# Patient Record
Sex: FEMALE | Race: BLACK OR AFRICAN AMERICAN | NOT HISPANIC OR LATINO | Employment: FULL TIME | URBAN - METROPOLITAN AREA
[De-identification: names, ages, dates, MRNs, and addresses within clinical notes are randomized per-mention and may not be internally consistent; named-entity substitution may affect disease eponyms.]

---

## 2021-06-04 ENCOUNTER — HOSPITAL ENCOUNTER (OUTPATIENT)
Dept: RADIOLOGY | Facility: HOSPITAL | Age: 26
Discharge: HOME | End: 2021-06-04
Attending: STUDENT IN AN ORGANIZED HEALTH CARE EDUCATION/TRAINING PROGRAM
Payer: MEDICARE

## 2021-06-04 DIAGNOSIS — R10.9 ABDOMINAL PAIN: ICD-10-CM

## 2021-06-04 PROCEDURE — 76705 ECHO EXAM OF ABDOMEN: CPT | Mod: TC

## 2021-06-04 PROCEDURE — 76705 US ABDOMEN LIMITED: ICD-10-PCS | Mod: 26,,, | Performed by: INTERNAL MEDICINE

## 2021-06-04 PROCEDURE — 76705 ECHO EXAM OF ABDOMEN: CPT | Mod: 26,,, | Performed by: INTERNAL MEDICINE

## 2022-11-08 ENCOUNTER — OFFICE VISIT (OUTPATIENT)
Dept: URGENT CARE | Facility: CLINIC | Age: 27
End: 2022-11-08
Payer: COMMERCIAL

## 2022-11-08 VITALS
RESPIRATION RATE: 18 BRPM | SYSTOLIC BLOOD PRESSURE: 124 MMHG | HEART RATE: 65 BPM | TEMPERATURE: 99 F | DIASTOLIC BLOOD PRESSURE: 85 MMHG | WEIGHT: 203 LBS | HEIGHT: 64 IN | BODY MASS INDEX: 34.66 KG/M2 | OXYGEN SATURATION: 98 %

## 2022-11-08 DIAGNOSIS — J02.0 STREP PHARYNGITIS: ICD-10-CM

## 2022-11-08 DIAGNOSIS — R13.12 OROPHARYNGEAL DYSPHAGIA: ICD-10-CM

## 2022-11-08 DIAGNOSIS — R09.A2 GLOBUS SENSATION: ICD-10-CM

## 2022-11-08 DIAGNOSIS — K21.9 GASTROESOPHAGEAL REFLUX DISEASE WITHOUT ESOPHAGITIS: ICD-10-CM

## 2022-11-08 DIAGNOSIS — R59.9 GLANDS SWOLLEN: Primary | ICD-10-CM

## 2022-11-08 LAB
CTP QC/QA: YES
MOLECULAR STREP A: POSITIVE

## 2022-11-08 PROCEDURE — 70360 X-RAY EXAM OF NECK: CPT | Mod: FY,S$GLB,, | Performed by: RADIOLOGY

## 2022-11-08 PROCEDURE — 96372 PR INJECTION,THERAP/PROPH/DIAG2ST, IM OR SUBCUT: ICD-10-PCS | Mod: S$GLB,,, | Performed by: FAMILY MEDICINE

## 2022-11-08 PROCEDURE — 99214 PR OFFICE/OUTPT VISIT, EST, LEVL IV, 30-39 MIN: ICD-10-PCS | Mod: 25,S$GLB,, | Performed by: FAMILY MEDICINE

## 2022-11-08 PROCEDURE — 3008F BODY MASS INDEX DOCD: CPT | Mod: CPTII,S$GLB,, | Performed by: FAMILY MEDICINE

## 2022-11-08 PROCEDURE — 1160F PR REVIEW ALL MEDS BY PRESCRIBER/CLIN PHARMACIST DOCUMENTED: ICD-10-PCS | Mod: CPTII,S$GLB,, | Performed by: FAMILY MEDICINE

## 2022-11-08 PROCEDURE — 1160F RVW MEDS BY RX/DR IN RCRD: CPT | Mod: CPTII,S$GLB,, | Performed by: FAMILY MEDICINE

## 2022-11-08 PROCEDURE — 3008F PR BODY MASS INDEX (BMI) DOCUMENTED: ICD-10-PCS | Mod: CPTII,S$GLB,, | Performed by: FAMILY MEDICINE

## 2022-11-08 PROCEDURE — 87651 STREP A DNA AMP PROBE: CPT | Mod: QW,S$GLB,, | Performed by: FAMILY MEDICINE

## 2022-11-08 PROCEDURE — 3074F SYST BP LT 130 MM HG: CPT | Mod: CPTII,S$GLB,, | Performed by: FAMILY MEDICINE

## 2022-11-08 PROCEDURE — 70360 XR NECK SOFT TISSUE: ICD-10-PCS | Mod: FY,S$GLB,, | Performed by: RADIOLOGY

## 2022-11-08 PROCEDURE — 96372 THER/PROPH/DIAG INJ SC/IM: CPT | Mod: S$GLB,,, | Performed by: FAMILY MEDICINE

## 2022-11-08 PROCEDURE — 3074F PR MOST RECENT SYSTOLIC BLOOD PRESSURE < 130 MM HG: ICD-10-PCS | Mod: CPTII,S$GLB,, | Performed by: FAMILY MEDICINE

## 2022-11-08 PROCEDURE — 3079F DIAST BP 80-89 MM HG: CPT | Mod: CPTII,S$GLB,, | Performed by: FAMILY MEDICINE

## 2022-11-08 PROCEDURE — 99214 OFFICE O/P EST MOD 30 MIN: CPT | Mod: 25,S$GLB,, | Performed by: FAMILY MEDICINE

## 2022-11-08 PROCEDURE — 3079F PR MOST RECENT DIASTOLIC BLOOD PRESSURE 80-89 MM HG: ICD-10-PCS | Mod: CPTII,S$GLB,, | Performed by: FAMILY MEDICINE

## 2022-11-08 PROCEDURE — 1159F MED LIST DOCD IN RCRD: CPT | Mod: CPTII,S$GLB,, | Performed by: FAMILY MEDICINE

## 2022-11-08 PROCEDURE — 87651 POCT STREP A MOLECULAR: ICD-10-PCS | Mod: QW,S$GLB,, | Performed by: FAMILY MEDICINE

## 2022-11-08 PROCEDURE — 1159F PR MEDICATION LIST DOCUMENTED IN MEDICAL RECORD: ICD-10-PCS | Mod: CPTII,S$GLB,, | Performed by: FAMILY MEDICINE

## 2022-11-08 RX ORDER — IBUPROFEN 800 MG/1
800 TABLET ORAL EVERY 6 HOURS PRN
COMMUNITY
End: 2023-10-13

## 2022-11-08 RX ORDER — AMOXICILLIN AND CLAVULANATE POTASSIUM 875; 125 MG/1; MG/1
1 TABLET, FILM COATED ORAL EVERY 12 HOURS
Qty: 20 TABLET | Refills: 0 | Status: SHIPPED | OUTPATIENT
Start: 2022-11-08 | End: 2022-11-18

## 2022-11-08 RX ORDER — DEXAMETHASONE SODIUM PHOSPHATE 100 MG/10ML
10 INJECTION INTRAMUSCULAR; INTRAVENOUS
Status: COMPLETED | OUTPATIENT
Start: 2022-11-08 | End: 2022-11-08

## 2022-11-08 RX ORDER — OMEPRAZOLE 10 MG/1
10 CAPSULE, DELAYED RELEASE ORAL DAILY
COMMUNITY
End: 2022-12-20

## 2022-11-08 RX ADMIN — DEXAMETHASONE SODIUM PHOSPHATE 10 MG: 100 INJECTION INTRAMUSCULAR; INTRAVENOUS at 03:11

## 2022-11-08 NOTE — PROGRESS NOTES
"Subjective:       Patient ID: Moriah Lee is a 26 y.o. female.    Vitals:  height is 5' 4" (1.626 m) and weight is 92.1 kg (203 lb). Her oral temperature is 98.9 °F (37.2 °C). Her blood pressure is 124/85 and her pulse is 65. Her respiration is 18 and oxygen saturation is 98%.     Chief Complaint: Other Misc (Feeling as if something is stuck in the back of my throat. Has been happening for two weeks. - Entered by patient)    Pt states she has had swelling to back of throat for few weeks. Pt states swelling went down but then returned. Pt states she felt that it was triggered but her acid reflux so she paid no mind, but the swelling has returned, is getting much bigger and is becoming hard for her to swallow. Denies any chance of pregnancy.    Sore Throat   This is a new problem. The current episode started 1 to 4 weeks ago. The problem has been gradually worsening. Neither side of throat is experiencing more pain than the other. There has been no fever. The pain is at a severity of 0/10. The patient is experiencing no pain. Associated symptoms include swollen glands and trouble swallowing. Pertinent negatives include no congestion, coughing, shortness of breath or vomiting. Treatments tried: cough drops, robitussin. The treatment provided no relief.     Constitution: Negative for activity change, appetite change, chills, fatigue, fever and generalized weakness.   HENT:  Positive for sore throat and trouble swallowing. Negative for congestion, postnasal drip and sinus pressure.    Neck: Positive for neck swelling.   Cardiovascular:  Negative for chest pain, leg swelling, palpitations, sob on exertion and passing out.   Eyes:  Negative for vision loss.   Respiratory:  Negative for chest tightness, cough and shortness of breath.    Gastrointestinal:  Negative for nausea and vomiting.   Genitourinary:  Negative for dysuria.   Skin:  Negative for rash.   Neurological:  Negative for passing out, altered mental " "status and numbness.   Psychiatric/Behavioral:  Negative for altered mental status, confusion and agitation.      Objective:      Vitals:    11/08/22 1431   BP: 124/85   Pulse: 65   Resp: 18   Temp: 98.9 °F (37.2 °C)   TempSrc: Oral   SpO2: 98%   Weight: 92.1 kg (203 lb)   Height: 5' 4" (1.626 m)      Physical Exam   Constitutional: She is oriented to person, place, and time. She appears well-developed. She is cooperative.  Non-toxic appearance. She does not appear ill. No distress.   HENT:   Head: Normocephalic and atraumatic.   Ears:   Right Ear: Hearing, tympanic membrane, external ear and ear canal normal.   Left Ear: Hearing, tympanic membrane, external ear and ear canal normal.   Nose: Nose normal. No mucosal edema, rhinorrhea or nasal deformity. No epistaxis. Right sinus exhibits no maxillary sinus tenderness and no frontal sinus tenderness. Left sinus exhibits no maxillary sinus tenderness and no frontal sinus tenderness.   Mouth/Throat: Uvula is midline and mucous membranes are normal. No trismus in the jaw. Normal dentition. No uvula swelling. Posterior oropharyngeal erythema present. No oropharyngeal exudate or posterior oropharyngeal edema.   Eyes: Conjunctivae and lids are normal. No scleral icterus.   Neck: Trachea normal and phonation normal. Neck supple. No edema present. No erythema present. No neck rigidity present.   Cardiovascular: Normal rate, regular rhythm, normal heart sounds and normal pulses.   Pulmonary/Chest: Effort normal and breath sounds normal. No respiratory distress. She has no decreased breath sounds. She has no rhonchi.   Abdominal: Normal appearance and bowel sounds are normal. Soft.   Lymphadenopathy:     She has cervical adenopathy.   Neurological: no focal deficit. She is alert and oriented to person, place, and time. She exhibits normal muscle tone.   Skin: Skin is warm, intact and not diaphoretic.   Psychiatric: Her speech is normal and behavior is normal. Judgment and " thought content normal.   Nursing note and vitals reviewed.      Results for orders placed or performed in visit on 11/08/22   POCT Strep A, Molecular   Result Value Ref Range    Molecular Strep A, POC Positive (A) Negative     Acceptable Yes       XR NECK SOFT TISSUE    Result Date: 11/8/2022  EXAMINATION: XR NECK SOFT TISSUE CLINICAL HISTORY: Other specified symptoms and signs involving the circulatory and respiratory systems TECHNIQUE: Single lateral view of the neck with submitted for review COMPARISON: None. FINDINGS: Please note, only single lateral view was submitted for review. Spinal alignment is appropriate, no significant vertebral body height loss or disc space height loss.  The facet joints are aligned.  No acute displaced fracture or dislocation of the cervical spine.  The paraspinous and hypopharyngeal soft tissues are unremarkable.  No tonsillar enlargement.  No epiglottic enlargement.     1. No discrete radiographic abnormality of the soft tissues of the neck allowing for single lateral view. Electronically signed by: Ian Kimball MD Date:    11/08/2022 Time:    15:55    Assessment:       1. Glands swollen    2. Strep pharyngitis    3. Globus sensation    4. Oropharyngeal dysphagia    5. Gastroesophageal reflux disease without esophagitis          Plan:         Glands swollen  -     POCT Strep A, Molecular  -     dexAMETHasone injection 10 mg    2. Strep pharyngitis  -     Ambulatory referral/consult to ENT for persistent symptoms  -     amoxicillin-clavulanate 875-125mg (AUGMENTIN) 875-125 mg per tablet; Take 1 tablet by mouth every 12 (twelve) hours. Take with yogurt or probiotic to protect the stomach/ prevent diarrhea. for 10 days  Dispense: 20 tablet; Refill: 0    3. Globus sensation  -     XR NECK SOFT TISSUE; Future; Expected date: 11/08/2022  -     dexAMETHasone injection 10 mg  -     (Magic mouthwash) 1:1:1 diphenhydrAMINE(Benadryl) 12.5mg/5ml liq, aluminum & magnesium  hydroxide-simethicone (Maalox), LIDOcaine viscous 2%; Swish and spit 15 mLs every 4 (four) hours as needed (irritation/ pain/ discomfort.).  Dispense: 450 mL; Refill: 0  -     Ambulatory referral/consult to ENT    4. Oropharyngeal dysphagia  -     Ambulatory referral/consult to ENT for persistent treatment despite treatment of strep    5. Gastroesophageal reflux disease without esophagitis  -     Ambulatory referral/consult to Gastroenterology. Continue PPI therapy.    Patient Instructions   You received a steroid shot today  -this can elevate your blood pressure, elevate blood sugar, cause water weight gain, nervous energy, redness to the face and dimpling of the skin where the shot was administered.     I have placed referrals to ENT and Gastroenterology  Call to schedule an appointment: [phone]                     20-Oct-2019 05:44

## 2022-11-08 NOTE — PATIENT INSTRUCTIONS
You received a steroid shot today  -this can elevate your blood pressure, elevate blood sugar, cause water weight gain, nervous energy, redness to the face and dimpling of the skin where the shot was administered.     I have placed referrals to ENT and Gastroenterology  Call to schedule an appointment: [phone]

## 2022-12-20 ENCOUNTER — OFFICE VISIT (OUTPATIENT)
Dept: OTOLARYNGOLOGY | Facility: CLINIC | Age: 27
End: 2022-12-20
Payer: COMMERCIAL

## 2022-12-20 VITALS — HEART RATE: 70 BPM | DIASTOLIC BLOOD PRESSURE: 90 MMHG | SYSTOLIC BLOOD PRESSURE: 130 MMHG

## 2022-12-20 DIAGNOSIS — K21.9 LARYNGOPHARYNGEAL REFLUX (LPR): Primary | ICD-10-CM

## 2022-12-20 DIAGNOSIS — J30.89 NON-SEASONAL ALLERGIC RHINITIS, UNSPECIFIED TRIGGER: ICD-10-CM

## 2022-12-20 PROCEDURE — 99999 PR PBB SHADOW E&M-EST. PATIENT-LVL III: CPT | Mod: PBBFAC,,, | Performed by: NURSE PRACTITIONER

## 2022-12-20 PROCEDURE — 3080F PR MOST RECENT DIASTOLIC BLOOD PRESSURE >= 90 MM HG: ICD-10-PCS | Mod: CPTII,S$GLB,, | Performed by: NURSE PRACTITIONER

## 2022-12-20 PROCEDURE — 31575 LARYNGOSCOPY: ICD-10-PCS | Mod: S$GLB,,, | Performed by: NURSE PRACTITIONER

## 2022-12-20 PROCEDURE — 3080F DIAST BP >= 90 MM HG: CPT | Mod: CPTII,S$GLB,, | Performed by: NURSE PRACTITIONER

## 2022-12-20 PROCEDURE — 1159F PR MEDICATION LIST DOCUMENTED IN MEDICAL RECORD: ICD-10-PCS | Mod: CPTII,S$GLB,, | Performed by: NURSE PRACTITIONER

## 2022-12-20 PROCEDURE — 3075F PR MOST RECENT SYSTOLIC BLOOD PRESS GE 130-139MM HG: ICD-10-PCS | Mod: CPTII,S$GLB,, | Performed by: NURSE PRACTITIONER

## 2022-12-20 PROCEDURE — 99203 PR OFFICE/OUTPT VISIT, NEW, LEVL III, 30-44 MIN: ICD-10-PCS | Mod: 25,S$GLB,, | Performed by: NURSE PRACTITIONER

## 2022-12-20 PROCEDURE — 99999 PR PBB SHADOW E&M-EST. PATIENT-LVL III: ICD-10-PCS | Mod: PBBFAC,,, | Performed by: NURSE PRACTITIONER

## 2022-12-20 PROCEDURE — 3075F SYST BP GE 130 - 139MM HG: CPT | Mod: CPTII,S$GLB,, | Performed by: NURSE PRACTITIONER

## 2022-12-20 PROCEDURE — 99203 OFFICE O/P NEW LOW 30 MIN: CPT | Mod: 25,S$GLB,, | Performed by: NURSE PRACTITIONER

## 2022-12-20 PROCEDURE — 31575 DIAGNOSTIC LARYNGOSCOPY: CPT | Mod: S$GLB,,, | Performed by: NURSE PRACTITIONER

## 2022-12-20 PROCEDURE — 1159F MED LIST DOCD IN RCRD: CPT | Mod: CPTII,S$GLB,, | Performed by: NURSE PRACTITIONER

## 2022-12-20 RX ORDER — FLUTICASONE PROPIONATE 50 MCG
2 SPRAY, SUSPENSION (ML) NASAL DAILY
Qty: 16 G | Refills: 2 | Status: SHIPPED | OUTPATIENT
Start: 2022-12-20 | End: 2023-03-20

## 2022-12-20 RX ORDER — LORATADINE 10 MG/1
10 TABLET ORAL DAILY
Qty: 30 TABLET | Refills: 5 | Status: SHIPPED | OUTPATIENT
Start: 2022-12-20 | End: 2023-06-06

## 2022-12-20 RX ORDER — OMEPRAZOLE 40 MG/1
40 CAPSULE, DELAYED RELEASE ORAL
Qty: 90 CAPSULE | Refills: 0 | Status: SHIPPED | OUTPATIENT
Start: 2022-12-20 | End: 2023-03-20

## 2022-12-20 NOTE — PATIENT INSTRUCTIONS
"LARYNGOPHARYNGEAL REFLUX  (SILENT OR ATYPICAL REFLUX)      Do you have to clear your throat or cough often? Are you hoarse? Do you have trouble swallowing? If you have these or other throat symptoms, you may have acid reflux. This occurs when stomach acid flows back up and irritates your throat.    Why you have throat symptoms  There are muscles (esophageal sphincters) at both ends of the tube that carries food to your stomach (the esophagus). These muscles relax to let food pass. Then they tighten to keep stomach acid down. When the lower esophageal sphincter (LES) doesnt tighten enough, acid can flow back (reflux) from your stomach into your esophagus. This may cause heartburn. In some cases the upper esophageal sphincter (UES) also doesnt work well. Then acid can travel higher and enter your throat (pharynx). In many cases, this causes throat symptoms.  Common throat symptoms  Need to clear your throat often  Feeling like youre choking  Long-term (chronic) cough  Hoarseness  Trouble swallowing  Feel like you have a lump in your throat  Sour or acid taste  Sore throat that keeps coming back       If you have any of the following symptoms you may have laryngopharyngeal reflux (LPR):  hoarseness, thick or too much mucus, chronic throat pain/irritation, chronic throat clearing, chronic cough, especially cough that wake you up from sleep, chronic "postnasal drip" without the need to blow your nose.     Many people with LPR do not have symptoms of heartburn. Compared to the esophagus, the voice box and the back of the throat are significantly more sensitive to the effects of acid and digestive enzymes on surrounding tissue. Acid passing quickly through the esophagus does not have a chance to irritate the area for too long.  However acid that pools in the throat or voice box can cause prolonged irritation resulting in the symptoms of LPR.    The symptoms of LPR can consist of a dry cough and the sensation of " "something being stuck in the throat.  Some people will complain of heartburn while others may have intermittent hoarseness or loss of voice.  Another major symptom of LPR is "postnasal drip."  Patients are often told symptoms are due to abnormal nasal drainage or sinus infection; however this is rarely the cause of chronic throat irritation. For post nasal drip to cause the complaints described, signs and symptoms of an active nasal infection should be present.     Treatments for LPR include: postural changes (sleeping or laying with an incline), weight reduction, diet modification, medication to reduce stomach acid and promote normal motility, and rarely: surgery to prevent reflux. Most patients will begin to notice some relief in her symptoms about 2-4 weeks after starting the medication; however it is generally recommended the medication should be continued for at least 2 months. If the symptoms completely resolve, the medication can then be tapered.  Some people will remain symptom free while others may have relapses which required treatment again.    Things you may be able to do to prevent reflux.  1. Do not smoke.  Smoking will worsen reflux.    2. Avoid eating at least 2-3 hours prior to bedtime.  Try to avoid very large meals at night.    4. Weight loss.  For patient's with recent weight gain, shedding a few pounds is all that is required to improve reflux.    5. Avoid reflux triggers. These can worsen acid in the stomach or even cause the esophagus muscles to relax: caffeine, soft drinks, acidic foods (tomato, lots of fruit) mints, alcoholic beverages, particularly at night, cheese, fried foods, spicy foods, eggs, and chocolate.    6. Sleep with the head of bed elevated at least 6 inches.   "

## 2022-12-20 NOTE — PROGRESS NOTES
Subjective:    Moriah Lee is a 27 y.o. female who was referred to me by Dr. Ernestina Linder in consultation for globus sensation.    She reports 1.5 months of globus sensation and dysphagia.  She notes that the globus sensation is constant, but the swallowing difficulty comes and goes - she has not noticed a pattern to this or any food triggers.  She reports some excess laryngeal mucus.  She denies throat pain, odynophagia, throat clearing, cough, voice changes, and hoarseness.   She reports reflux that has been present for many years and she takes Omeprazole PRN, but has never had a EGD.  She also reports a remote history of peptic ulcers.  She reports reflux symptoms about 3 times weekly.  She does report allergic symptoms- itchy nose, watery eyes.  She takes Zyrtec PRN.  She was recently evaluated and diagnosed with Strep A in early November.  She was given antibiotics, steroid injection and magic mouth wash.  She reports her strep symptoms have resolved.     She does not recall previously having allergy testing.  She denies a history of asthma.  She relates a history of reflux symptoms which is currently managed with Omeprazole PRN.  She has not previously had an EGD.  She denies have a diagnosis of obstructive sleep apnea.   She has not had sinonasal surgery.  She does not recall a prior history of nasal trauma.    Past Medical History  She has a past medical history of Allergy, Anxiety, Bipolar disorder, Depression, and GERD (gastroesophageal reflux disease).    Past Surgical History  She has no past surgical history on file.    Family History  Her family history includes ADD / ADHD in her brother; Breast cancer in her mother; Diabetes in her maternal grandmother; Heart disease in her paternal grandmother; No Known Problems in her father, maternal grandfather, and paternal grandfather.    Social History  She reports that she has never smoked. She has never used smokeless tobacco. She reports that  she does not drink alcohol and does not use drugs.    Allergies  She is allergic to venom-honey bee.    Medications  She has a current medication list which includes the following prescription(s): diphenhydramine hcl, fluticasone propionate, ibuprofen, loratadine, and omeprazole.    Review of Systems   Constitutional:  Positive for appetite change.   HENT:  Positive for postnasal drip and trouble swallowing.    Eyes: Negative.    Respiratory: Negative.     Cardiovascular: Negative.    Endocrine: Negative.    Genitourinary: Negative.    Musculoskeletal: Negative.    Skin: Negative.    Allergic/Immunologic: Negative.    Neurological: Negative.    Hematological: Negative.    Psychiatric/Behavioral:  Positive for sleep disturbance.       Objective:   BP (!) 130/90   Pulse 70      Constitutional:   She is oriented to person, place, and time. She appears well-developed and well-nourished. She appears alert. She is cooperative.  Non-toxic appearance. She does not have a sickly appearance. She does not appear ill. No distress. Normal speech.      Head:  Normocephalic and atraumatic. Not macrocephalic and not microcephalic. Head is without right periorbital erythema, without left periorbital erythema and without TMJ tenderness. Salivary glands normal.  Facial strength is normal.      Ears:    Right Ear: No lacerations. No drainage, swelling or tenderness. No mastoid tenderness. Tympanic membrane is not injected, not scarred, not perforated, not erythematous, not retracted and not bulging. No middle ear effusion.   Left Ear: No lacerations. No drainage, swelling or tenderness. No mastoid tenderness. Tympanic membrane is not injected, not scarred, not perforated, not erythematous, not retracted and not bulging.  No middle ear effusion.     Nose:  No mucosal edema, rhinorrhea or sinus tenderness. No epistaxis. Right sinus exhibits no maxillary sinus tenderness and no frontal sinus tenderness. Left sinus exhibits no maxillary  sinus tenderness and no frontal sinus tenderness.         Mouth/Throat  Oropharynx not clear and moist and normal uvula midline (elongated). Normal dentition. No uvula swelling. No oropharyngeal exudate. Tonsils present, +2.      Neck:  Trachea normal, phonation normal and no adenopathy.     Pulmonary/Chest:   Effort normal.     Psychiatric:   She has a normal mood and affect. Her speech is normal and behavior is normal.     Neurological:   She is alert and oriented to person, place, and time. She has neurological normal, alert and oriented.   Procedure  Flexible laryngoscopy performed.  See procedure note.          Data Reviewed  No results found for: WBC  No results found for: EOSINOPHIL  No results found for: EOS  No results found for: PLT  No results found for: GLU    Assessment:     1. Laryngopharyngeal reflux (LPR)    2. Non-seasonal allergic rhinitis, unspecified trigger      Plan:     I had a long discussion with the patient regarding her condition and the further workup and management options.    Moriah was seen today for swallowing problems.    Diagnoses and all orders for this visit:    Laryngopharyngeal reflux (LPR)  Discussed the etiology of LPR and management strategies including nonpharmacologic treatments: eating smaller meals, eating at least 3 hours before bed, elevation of the head of bed at night, avoidance of caffeine, chocolate, nicotine and peppermint, and avoiding tight fitting clothing.  Will trial 3 months of PPI therapy.  May consider GI referral if symptoms are refractory to medication management.  Follow-up in 3 months.    -     Laryngoscopy        -     omeprazole (PRILOSEC) 40 MG capsule; Take 1 capsule (40 mg total) by mouth before dinner.    Non-seasonal allergic rhinitis, unspecified trigger  -     loratadine (CLARITIN) 10 mg tablet; Take 1 tablet (10 mg total) by mouth once daily.  -     fluticasone propionate (FLONASE) 50 mcg/actuation nasal spray; 2 sprays (100 mcg total) by  Each Nostril route once daily.

## 2022-12-20 NOTE — PROCEDURES
Laryngoscopy    Date/Time: 12/20/2022 11:30 AM  Performed by: Judy Montoya NP  Authorized by: Judy Montoya NP     Consent Done?:  Yes (Verbal)  Anesthesia:     Local anesthetic:  Lidocaine 4% and Cheikh-Synephrine 1/2%    Patient tolerance:  Patient tolerated the procedure well with no immediate complications  Laryngoscopy:     Areas examined:  Nasopharynx, oropharynx, hypopharynx, larynx, vocal cords and nasal cavities    Laryngoscope size:  4 mm  Nose External:      No external nasal deformity  Nose Intranasal:      Mucosa no polyps     Mucosa ulcers not present     No mucosa lesions present     No septum gross deformity     Turbinates not enlarged  Nasopharynx:      No mucosa lesions     Adenoids present     Posterior choanae patent     Eustachian tube patent  Larynx/hypopharynx:      No epiglottis lesions     No epiglottis edema     No AE folds lesions     No vocal cord polyps     Equal and normal bilateral     No hypopharynx lesions     Piriform sinus pooling     No piriform sinus lesions     No post cricoid edema     No post cricoid erythema     Procedure: With patient in seated position, the scope was inserted into the bilateral nasal passageways and advanced atraumatically through the right into the nasopharynx. Structures examined are listed above.  After examination performed, the scope was removed atraumatically.  The patient tolerated the procedure well, however became nauseous at the end of the exam and vomited.  She did fine after the episode of emesis.  Water was given and she noted she felt well enough to leave.

## 2023-05-25 ENCOUNTER — OFFICE VISIT (OUTPATIENT)
Dept: OBSTETRICS AND GYNECOLOGY | Facility: CLINIC | Age: 28
End: 2023-05-25
Payer: COMMERCIAL

## 2023-05-25 VITALS
WEIGHT: 216.94 LBS | BODY MASS INDEX: 37.24 KG/M2 | DIASTOLIC BLOOD PRESSURE: 82 MMHG | SYSTOLIC BLOOD PRESSURE: 124 MMHG

## 2023-05-25 DIAGNOSIS — R10.2 PELVIC CRAMPING: ICD-10-CM

## 2023-05-25 DIAGNOSIS — Z30.09 ENCOUNTER FOR COUNSELING REGARDING CONTRACEPTION: Primary | ICD-10-CM

## 2023-05-25 PROCEDURE — 3074F PR MOST RECENT SYSTOLIC BLOOD PRESSURE < 130 MM HG: ICD-10-PCS | Mod: CPTII,S$GLB,, | Performed by: STUDENT IN AN ORGANIZED HEALTH CARE EDUCATION/TRAINING PROGRAM

## 2023-05-25 PROCEDURE — 99203 PR OFFICE/OUTPT VISIT, NEW, LEVL III, 30-44 MIN: ICD-10-PCS | Mod: S$GLB,,, | Performed by: STUDENT IN AN ORGANIZED HEALTH CARE EDUCATION/TRAINING PROGRAM

## 2023-05-25 PROCEDURE — 99999 PR PBB SHADOW E&M-EST. PATIENT-LVL III: CPT | Mod: PBBFAC,,, | Performed by: STUDENT IN AN ORGANIZED HEALTH CARE EDUCATION/TRAINING PROGRAM

## 2023-05-25 PROCEDURE — 3079F PR MOST RECENT DIASTOLIC BLOOD PRESSURE 80-89 MM HG: ICD-10-PCS | Mod: CPTII,S$GLB,, | Performed by: STUDENT IN AN ORGANIZED HEALTH CARE EDUCATION/TRAINING PROGRAM

## 2023-05-25 PROCEDURE — 99999 PR PBB SHADOW E&M-EST. PATIENT-LVL III: ICD-10-PCS | Mod: PBBFAC,,, | Performed by: STUDENT IN AN ORGANIZED HEALTH CARE EDUCATION/TRAINING PROGRAM

## 2023-05-25 PROCEDURE — 3008F PR BODY MASS INDEX (BMI) DOCUMENTED: ICD-10-PCS | Mod: CPTII,S$GLB,, | Performed by: STUDENT IN AN ORGANIZED HEALTH CARE EDUCATION/TRAINING PROGRAM

## 2023-05-25 PROCEDURE — 3074F SYST BP LT 130 MM HG: CPT | Mod: CPTII,S$GLB,, | Performed by: STUDENT IN AN ORGANIZED HEALTH CARE EDUCATION/TRAINING PROGRAM

## 2023-05-25 PROCEDURE — 1159F PR MEDICATION LIST DOCUMENTED IN MEDICAL RECORD: ICD-10-PCS | Mod: CPTII,S$GLB,, | Performed by: STUDENT IN AN ORGANIZED HEALTH CARE EDUCATION/TRAINING PROGRAM

## 2023-05-25 PROCEDURE — 1159F MED LIST DOCD IN RCRD: CPT | Mod: CPTII,S$GLB,, | Performed by: STUDENT IN AN ORGANIZED HEALTH CARE EDUCATION/TRAINING PROGRAM

## 2023-05-25 PROCEDURE — 3008F BODY MASS INDEX DOCD: CPT | Mod: CPTII,S$GLB,, | Performed by: STUDENT IN AN ORGANIZED HEALTH CARE EDUCATION/TRAINING PROGRAM

## 2023-05-25 PROCEDURE — 99203 OFFICE O/P NEW LOW 30 MIN: CPT | Mod: S$GLB,,, | Performed by: STUDENT IN AN ORGANIZED HEALTH CARE EDUCATION/TRAINING PROGRAM

## 2023-05-25 PROCEDURE — 3079F DIAST BP 80-89 MM HG: CPT | Mod: CPTII,S$GLB,, | Performed by: STUDENT IN AN ORGANIZED HEALTH CARE EDUCATION/TRAINING PROGRAM

## 2023-05-25 RX ORDER — NORELGESTROMIN AND ETHINYL ESTRADIOL 150; 35 UG/D; UG/D
1 PATCH TRANSDERMAL WEEKLY
Qty: 12 PATCH | Refills: 3 | Status: SHIPPED | OUTPATIENT
Start: 2023-05-25 | End: 2024-03-28 | Stop reason: SDUPTHER

## 2023-05-25 RX ORDER — NAPROXEN 500 MG/1
500 TABLET ORAL 2 TIMES DAILY WITH MEALS
Qty: 60 TABLET | Refills: 1 | Status: SHIPPED | OUTPATIENT
Start: 2023-05-25 | End: 2023-08-03

## 2023-05-25 NOTE — PROGRESS NOTES
CC: Contraception and Well Woman    HPI:  Moriah Lee is a 27 y.o. female No obstetric history on file. presents to discuss contraception. Patient is sexually active with one partner, previously used OCPs but pills hard to remember to take. Would like to try patches for birth control. Also has cramping with periods and random times, used naproxen in past successfully but does not have current rx.     ROS:  GENERAL: No fever, chills, fatigability or weight loss.  VULVAR: No pain, no lesions and no itching.  VAGINAL: No relaxation, no itching, no discharge, no abnormal bleeding and no lesions.  ABDOMEN: Denies nausea. Denies vomiting. No diarrhea. No constipation  BREAST: Denies pain. No lumps. No discharge.  URINARY: No incontinence, no nocturia, no frequency and no dysuria.  CARDIOVASCULAR: No chest pain. No shortness of breath. No leg cramps.  NEUROLOGICAL: No headaches. No vision changes.      Patient History:  Past Medical History:   Diagnosis Date    Allergy     Anxiety     Bipolar disorder     Depression     GERD (gastroesophageal reflux disease)      History reviewed. No pertinent surgical history.  Social History     Tobacco Use    Smoking status: Never    Smokeless tobacco: Never   Substance Use Topics    Alcohol use: Never    Drug use: Never     Family History   Problem Relation Age of Onset    Breast cancer Mother         anemia, eczema    No Known Problems Father     ADD / ADHD Brother         asthma, depression    Diabetes Maternal Grandmother     No Known Problems Maternal Grandfather     Heart disease Paternal Grandmother     No Known Problems Paternal Grandfather      OB History   No obstetric history on file.       Objective:   /82   Wt 98.4 kg (216 lb 14.9 oz)   LMP 05/23/2023 (Exact Date)   BMI 37.24 kg/m²   Patient's last menstrual period was 05/23/2023 (exact date).      PHYSICAL EXAM:  APPEARANCE: Well nourished, well developed, in no acute distress.  AFFECT: WNL, alert and  oriented x 3  SKIN: No acne or hirsutism  NECK: Neck symmetric without masses or thyromegaly  NODES: No inguinal, cervical, axillary, or femoral lymph node enlargement  CHEST: Good respiratory effect  EXTREMITIES: No edema.      ASSESSMENT and PLAN:    ICD-10-CM ICD-9-CM    1. Encounter for counseling regarding contraception  Z30.09 V25.09 norelgestromin-ethinyl estradiol (XULANE) 150-35 mcg/24 hr      2. Pelvic cramping  R10.2 625.9 norelgestromin-ethinyl estradiol (XULANE) 150-35 mcg/24 hr      naproxen (NAPROSYN) 500 MG tablet        Contraception counseling   - discussed patches today with patient. Risks and benefits of method discussed, all patient questions answered.   - medical history and current medications reviewed: no contraindications for birth control  - offered STI testing, patient did not desire today as she is on her period   - rx sent for patches, other options discussed can trial if not satisfied with patches over next few months       Follow up: as needed if symptoms worsen or well woman exam/STI testing as desired after period       Hollie Bedolla MD  OBGYN [facility]

## 2023-06-06 RX ORDER — LORATADINE 10 MG/1
TABLET ORAL
Qty: 90 TABLET | Refills: 1 | Status: SHIPPED | OUTPATIENT
Start: 2023-06-06

## 2023-08-01 DIAGNOSIS — R10.2 PELVIC CRAMPING: ICD-10-CM

## 2023-08-03 RX ORDER — NAPROXEN 500 MG/1
500 TABLET ORAL 2 TIMES DAILY WITH MEALS
Qty: 60 TABLET | Refills: 1 | Status: SHIPPED | OUTPATIENT
Start: 2023-08-03

## 2023-10-13 ENCOUNTER — TELEPHONE (OUTPATIENT)
Dept: NEUROLOGY | Facility: CLINIC | Age: 28
End: 2023-10-13

## 2023-10-13 ENCOUNTER — OFFICE VISIT (OUTPATIENT)
Dept: PRIMARY CARE CLINIC | Facility: CLINIC | Age: 28
End: 2023-10-13
Payer: COMMERCIAL

## 2023-10-13 ENCOUNTER — PATIENT MESSAGE (OUTPATIENT)
Dept: NEUROLOGY | Facility: CLINIC | Age: 28
End: 2023-10-13

## 2023-10-13 VITALS
HEART RATE: 61 BPM | SYSTOLIC BLOOD PRESSURE: 120 MMHG | WEIGHT: 217.63 LBS | DIASTOLIC BLOOD PRESSURE: 80 MMHG | RESPIRATION RATE: 18 BRPM | TEMPERATURE: 97 F | OXYGEN SATURATION: 98 % | BODY MASS INDEX: 37.16 KG/M2 | HEIGHT: 64 IN

## 2023-10-13 DIAGNOSIS — Z11.4 ENCOUNTER FOR SCREENING FOR HIV: ICD-10-CM

## 2023-10-13 DIAGNOSIS — Z11.59 ENCOUNTER FOR HEPATITIS C SCREENING TEST FOR LOW RISK PATIENT: ICD-10-CM

## 2023-10-13 DIAGNOSIS — G44.229 CHRONIC TENSION-TYPE HEADACHE, NOT INTRACTABLE: ICD-10-CM

## 2023-10-13 DIAGNOSIS — Z00.00 ANNUAL PHYSICAL EXAM: Primary | ICD-10-CM

## 2023-10-13 PROCEDURE — 3008F PR BODY MASS INDEX (BMI) DOCUMENTED: ICD-10-PCS | Mod: CPTII,S$GLB,, | Performed by: INTERNAL MEDICINE

## 2023-10-13 PROCEDURE — 99999 PR PBB SHADOW E&M-EST. PATIENT-LVL IV: CPT | Mod: PBBFAC,,, | Performed by: INTERNAL MEDICINE

## 2023-10-13 PROCEDURE — 1160F RVW MEDS BY RX/DR IN RCRD: CPT | Mod: CPTII,S$GLB,, | Performed by: INTERNAL MEDICINE

## 2023-10-13 PROCEDURE — 99214 OFFICE O/P EST MOD 30 MIN: CPT | Mod: 25,S$GLB,, | Performed by: INTERNAL MEDICINE

## 2023-10-13 PROCEDURE — 99999 PR PBB SHADOW E&M-EST. PATIENT-LVL IV: ICD-10-PCS | Mod: PBBFAC,,, | Performed by: INTERNAL MEDICINE

## 2023-10-13 PROCEDURE — 3008F BODY MASS INDEX DOCD: CPT | Mod: CPTII,S$GLB,, | Performed by: INTERNAL MEDICINE

## 2023-10-13 PROCEDURE — 3074F PR MOST RECENT SYSTOLIC BLOOD PRESSURE < 130 MM HG: ICD-10-PCS | Mod: CPTII,S$GLB,, | Performed by: INTERNAL MEDICINE

## 2023-10-13 PROCEDURE — 1159F MED LIST DOCD IN RCRD: CPT | Mod: CPTII,S$GLB,, | Performed by: INTERNAL MEDICINE

## 2023-10-13 PROCEDURE — 1160F PR REVIEW ALL MEDS BY PRESCRIBER/CLIN PHARMACIST DOCUMENTED: ICD-10-PCS | Mod: CPTII,S$GLB,, | Performed by: INTERNAL MEDICINE

## 2023-10-13 PROCEDURE — 3074F SYST BP LT 130 MM HG: CPT | Mod: CPTII,S$GLB,, | Performed by: INTERNAL MEDICINE

## 2023-10-13 PROCEDURE — 99214 PR OFFICE/OUTPT VISIT, EST, LEVL IV, 30-39 MIN: ICD-10-PCS | Mod: 25,S$GLB,, | Performed by: INTERNAL MEDICINE

## 2023-10-13 PROCEDURE — 3079F DIAST BP 80-89 MM HG: CPT | Mod: CPTII,S$GLB,, | Performed by: INTERNAL MEDICINE

## 2023-10-13 PROCEDURE — 3079F PR MOST RECENT DIASTOLIC BLOOD PRESSURE 80-89 MM HG: ICD-10-PCS | Mod: CPTII,S$GLB,, | Performed by: INTERNAL MEDICINE

## 2023-10-13 PROCEDURE — 99385 PR PREVENTIVE VISIT,NEW,18-39: ICD-10-PCS | Mod: S$GLB,,, | Performed by: INTERNAL MEDICINE

## 2023-10-13 PROCEDURE — 99385 PREV VISIT NEW AGE 18-39: CPT | Mod: S$GLB,,, | Performed by: INTERNAL MEDICINE

## 2023-10-13 PROCEDURE — 1159F PR MEDICATION LIST DOCUMENTED IN MEDICAL RECORD: ICD-10-PCS | Mod: CPTII,S$GLB,, | Performed by: INTERNAL MEDICINE

## 2023-10-13 RX ORDER — BUTALBITAL, ACETAMINOPHEN AND CAFFEINE 50; 325; 40 MG/1; MG/1; MG/1
1 TABLET ORAL EVERY 6 HOURS PRN
Qty: 30 TABLET | Refills: 1 | Status: SHIPPED | OUTPATIENT
Start: 2023-10-13 | End: 2023-11-12

## 2023-10-13 NOTE — PROGRESS NOTES
"[facility] Note    Chief Complaint      Chief Complaint   Patient presents with    Headache    Establish Care       History of Present Illness      Moriah Lee is a 27 y.o. female who presents today for   Chief Complaint   Patient presents with    Headache    Establish Care   .  Patient comes to appointment here for annual preventative as well as for acute reason related to headaches . She is on only ocp currently which she has been on since may . She admits she could be more active with exercise . She needs full screening lasb . She is seeing Dr lazo gyn is uptodate       She states she started 2 weeks ago with "tension type pressure headache across forehead with some " blurry vision in right eye" . She states lights make worse . No nausea or vomiting associated .seh denies any auras . She states she joshua been taking ibuprofen but seems as if this is making the headaches progressively worse .    Problem List Items Addressed This Visit          Neuro    Chronic tension-type headache, not intractable    Overview     Hold ibuprofen   fiorcet as needed she will call back with update             ID    Encounter for hepatitis C screening test for low risk patient    Overview     Hep c screen          Encounter for screening for HIV    Overview     hiv screen             Other    Annual physical exam - Primary    Overview     pe documented needs full screening labs               Past Medical History:  Past Medical History:   Diagnosis Date    Allergy     Anxiety     Bipolar disorder     Depression     GERD (gastroesophageal reflux disease)        Past Surgical History:  History reviewed. No pertinent surgical history.    Family History:  family history includes ADD / ADHD in her brother; Alcohol abuse in her maternal aunt, maternal uncle, paternal aunt, and paternal uncle; Arthritis in her mother; Asthma in her brother; Breast cancer in her mother; Cancer in her paternal grandmother; " Diabetes in her maternal grandmother; Heart disease in her paternal grandmother; No Known Problems in her father, maternal grandfather, and paternal grandfather.    Social History:  Social History     Socioeconomic History    Marital status: Single   Tobacco Use    Smoking status: Never    Smokeless tobacco: Never   Substance and Sexual Activity    Alcohol use: Not Currently    Drug use: Never    Sexual activity: Yes     Partners: Male     Birth control/protection: Coitus interruptus, Patch       Review of Systems:   Review of Systems   Constitutional:  Negative for fever and weight loss.   HENT:  Negative for congestion, hearing loss and sore throat.    Eyes:  Positive for blurred vision and photophobia.   Respiratory:  Negative for cough and shortness of breath.    Cardiovascular:  Negative for chest pain, palpitations, claudication and leg swelling.   Gastrointestinal:  Negative for abdominal pain, constipation, diarrhea and heartburn.   Genitourinary:  Negative for dysuria.   Musculoskeletal:  Negative for back pain and myalgias.   Skin:  Negative for rash.   Neurological:  Positive for headaches. Negative for focal weakness.   Psychiatric/Behavioral:  Negative for depression and suicidal ideas. The patient is not nervous/anxious.          Medications:  Outpatient Encounter Medications as of 10/13/2023   Medication Sig Dispense Refill    fluticasone propionate (FLONASE) 50 mcg/actuation nasal spray SPRAY 2 SPRAYS BY EACH NOSTRIL ROUTE ONCE DAILY. 48 mL 5    loratadine (CLARITIN) 10 mg tablet TAKE 1 TABLET BY MOUTH EVERY DAY 90 tablet 1    naproxen (NAPROSYN) 500 MG tablet TAKE 1 TABLET BY MOUTH TWICE A DAY WITH MEALS 60 tablet 1    norelgestromin-ethinyl estradiol (XULANE) 150-35 mcg/24 hr Place 1 patch onto the skin once a week. 12 patch 3    omeprazole (PRILOSEC) 40 MG capsule TAKE 1 CAPSULE (40 MG TOTAL) BY MOUTH BEFORE DINNER. 90 capsule 0    butalbital-acetaminophen-caffeine -40 mg (FIORICET, ESGIC)  "-40 mg per tablet Take 1 tablet by mouth every 6 (six) hours as needed for Headaches. 30 tablet 1    [DISCONTINUED] (Magic mouthwash) 1:1:1 diphenhydrAMINE(Benadryl) 12.5mg/5ml liq, aluminum & magnesium hydroxide-simethicone (Maalox), LIDOcaine viscous 2% Swish and spit 15 mLs every 4 (four) hours as needed (irritation/ pain/ discomfort.). (Patient not taking: Reported on 12/20/2022) 450 mL 0    [DISCONTINUED] ibuprofen (ADVIL,MOTRIN) 800 MG tablet Take 800 mg by mouth every 6 (six) hours as needed.       No facility-administered encounter medications on file as of 10/13/2023.        Allergies:  Review of patient's allergies indicates:   Allergen Reactions    Venom-honey bee Other (See Comments)         Physical Exam         Vitals:    10/13/23 1047   BP: 120/80   Pulse: 61   Resp: 18   Temp: 97 °F (36.1 °C)         Physical Exam  Constitutional:       Appearance: She is well-developed.   Eyes:      Pupils: Pupils are equal, round, and reactive to light.   Neck:      Thyroid: No thyromegaly.   Cardiovascular:      Rate and Rhythm: Normal rate.      Heart sounds: Normal heart sounds. No murmur heard.     No friction rub. No gallop.   Pulmonary:      Breath sounds: Normal breath sounds.   Abdominal:      General: Bowel sounds are normal.      Palpations: Abdomen is soft.   Musculoskeletal:         General: Normal range of motion.      Cervical back: Normal range of motion.   Lymphadenopathy:      Cervical: No cervical adenopathy.   Skin:     General: Skin is warm.      Findings: No rash.   Neurological:      Mental Status: She is alert and oriented to person, place, and time.      Cranial Nerves: No cranial nerve deficit.   Psychiatric:         Behavior: Behavior normal.          Laboratory:  CBC:  No results for input(s): "WBC", "RBC", "HGB", "HCT", "PLT", "MCV", "MCH", "MCHC" in the last 2160 hours.  CMP:  No results for input(s): "GLU", "CALCIUM", "ALBUMIN", "PROT", "NA", "K", "CO2", "CL", "BUN", "ALKPHOS", " ""ALT", "AST", "BILITOT" in the last 2160 hours.    Invalid input(s): "CREATININ"  URINALYSIS:  No results for input(s): "COLORU", "CLARITYU", "SPECGRAV", "PHUR", "PROTEINUA", "GLUCOSEU", "BILIRUBINCON", "BLOODU", "WBCU", "RBCU", "BACTERIA", "MUCUS", "NITRITE", "LEUKOCYTESUR", "UROBILINOGEN", "HYALINECASTS" in the last 2160 hours.   LIPIDS:  No results for input(s): "TSH", "HDL", "CHOL", "TRIG", "LDLCALC", "CHOLHDL", "NONHDLCHOL", "TOTALCHOLEST" in the last 2160 hours.  TSH:  No results for input(s): "TSH" in the last 2160 hours.  A1C:  No results for input(s): "HGBA1C" in the last 2160 hours.    Radiology:        Assessment:     Moriah Lee is a 27 y.o.female with:    Annual physical exam  -     CBC Auto Differential; Future; Expected date: 10/13/2023  -     Comprehensive Metabolic Panel; Future; Expected date: 10/13/2023  -     Lipid Panel; Future; Expected date: 10/13/2023  -     TSH; Future; Expected date: 10/13/2023    Encounter for hepatitis C screening test for low risk patient  -     Hepatitis C Antibody; Future; Expected date: 10/13/2023    Encounter for screening for HIV  -     HIV 1/2 Ag/Ab (4th Gen); Future; Expected date: 10/13/2023    Chronic tension-type headache, not intractable  -     butalbital-acetaminophen-caffeine -40 mg (FIORICET, ESGIC) -40 mg per tablet; Take 1 tablet by mouth every 6 (six) hours as needed for Headaches.  Dispense: 30 tablet; Refill: 1          Plan:     Problem List Items Addressed This Visit          Neuro    Chronic tension-type headache, not intractable    Overview     Hold ibuprofen   fiorcet as needed she will call back with update             ID    Encounter for hepatitis C screening test for low risk patient    Overview     Hep c screen          Encounter for screening for HIV    Overview     hiv screen             Other    Annual physical exam - Primary    Overview     pe documented needs full screening labs             As above, continue current " medications and maintain follow up with specialists.  Return to clinic in 12 months.      Frederick W Dantagnan [facility]

## 2023-10-16 ENCOUNTER — LAB VISIT (OUTPATIENT)
Dept: LAB | Facility: HOSPITAL | Age: 28
End: 2023-10-16
Attending: INTERNAL MEDICINE
Payer: COMMERCIAL

## 2023-10-16 ENCOUNTER — PATIENT MESSAGE (OUTPATIENT)
Dept: PRIMARY CARE CLINIC | Facility: CLINIC | Age: 28
End: 2023-10-16
Payer: COMMERCIAL

## 2023-10-16 DIAGNOSIS — Z11.59 ENCOUNTER FOR HEPATITIS C SCREENING TEST FOR LOW RISK PATIENT: ICD-10-CM

## 2023-10-16 DIAGNOSIS — Z11.4 ENCOUNTER FOR SCREENING FOR HIV: ICD-10-CM

## 2023-10-16 DIAGNOSIS — Z00.00 ANNUAL PHYSICAL EXAM: ICD-10-CM

## 2023-10-16 LAB
ALBUMIN SERPL BCP-MCNC: 4.1 G/DL (ref 3.5–5.2)
ALP SERPL-CCNC: 54 U/L (ref 55–135)
ALT SERPL W/O P-5'-P-CCNC: 11 U/L (ref 10–44)
ANION GAP SERPL CALC-SCNC: 10 MMOL/L (ref 8–16)
AST SERPL-CCNC: 18 U/L (ref 10–40)
BASOPHILS # BLD AUTO: 0.06 K/UL (ref 0–0.2)
BASOPHILS NFR BLD: 0.6 % (ref 0–1.9)
BILIRUB SERPL-MCNC: 0.3 MG/DL (ref 0.1–1)
BUN SERPL-MCNC: 9 MG/DL (ref 6–20)
CALCIUM SERPL-MCNC: 9.5 MG/DL (ref 8.7–10.5)
CHLORIDE SERPL-SCNC: 106 MMOL/L (ref 95–110)
CHOLEST SERPL-MCNC: 183 MG/DL (ref 120–199)
CHOLEST/HDLC SERPL: 5.4 {RATIO} (ref 2–5)
CO2 SERPL-SCNC: 23 MMOL/L (ref 23–29)
CREAT SERPL-MCNC: 0.8 MG/DL (ref 0.5–1.4)
DIFFERENTIAL METHOD: ABNORMAL
EOSINOPHIL # BLD AUTO: 0.2 K/UL (ref 0–0.5)
EOSINOPHIL NFR BLD: 1.8 % (ref 0–8)
ERYTHROCYTE [DISTWIDTH] IN BLOOD BY AUTOMATED COUNT: 13 % (ref 11.5–14.5)
EST. GFR  (NO RACE VARIABLE): >60 ML/MIN/1.73 M^2
GLUCOSE SERPL-MCNC: 100 MG/DL (ref 70–110)
HCT VFR BLD AUTO: 37.3 % (ref 37–48.5)
HCV AB SERPL QL IA: NORMAL
HDLC SERPL-MCNC: 34 MG/DL (ref 40–75)
HDLC SERPL: 18.6 % (ref 20–50)
HGB BLD-MCNC: 11.8 G/DL (ref 12–16)
HIV 1+2 AB+HIV1 P24 AG SERPL QL IA: NORMAL
IMM GRANULOCYTES # BLD AUTO: 0.01 K/UL (ref 0–0.04)
IMM GRANULOCYTES NFR BLD AUTO: 0.1 % (ref 0–0.5)
LDLC SERPL CALC-MCNC: 138.4 MG/DL (ref 63–159)
LYMPHOCYTES # BLD AUTO: 2.5 K/UL (ref 1–4.8)
LYMPHOCYTES NFR BLD: 25.2 % (ref 18–48)
MCH RBC QN AUTO: 27.3 PG (ref 27–31)
MCHC RBC AUTO-ENTMCNC: 31.6 G/DL (ref 32–36)
MCV RBC AUTO: 86 FL (ref 82–98)
MONOCYTES # BLD AUTO: 0.9 K/UL (ref 0.3–1)
MONOCYTES NFR BLD: 9 % (ref 4–15)
NEUTROPHILS # BLD AUTO: 6.3 K/UL (ref 1.8–7.7)
NEUTROPHILS NFR BLD: 63.3 % (ref 38–73)
NONHDLC SERPL-MCNC: 149 MG/DL
NRBC BLD-RTO: 0 /100 WBC
PLATELET # BLD AUTO: 262 K/UL (ref 150–450)
PMV BLD AUTO: 12.4 FL (ref 9.2–12.9)
POTASSIUM SERPL-SCNC: 4.1 MMOL/L (ref 3.5–5.1)
PROT SERPL-MCNC: 8.1 G/DL (ref 6–8.4)
RBC # BLD AUTO: 4.32 M/UL (ref 4–5.4)
SODIUM SERPL-SCNC: 139 MMOL/L (ref 136–145)
TRIGL SERPL-MCNC: 53 MG/DL (ref 30–150)
TSH SERPL DL<=0.005 MIU/L-ACNC: 1.11 UIU/ML (ref 0.4–4)
WBC # BLD AUTO: 9.92 K/UL (ref 3.9–12.7)

## 2023-10-16 PROCEDURE — 80061 LIPID PANEL: CPT | Performed by: INTERNAL MEDICINE

## 2023-10-16 PROCEDURE — 36415 COLL VENOUS BLD VENIPUNCTURE: CPT | Performed by: INTERNAL MEDICINE

## 2023-10-16 PROCEDURE — 86803 HEPATITIS C AB TEST: CPT | Performed by: INTERNAL MEDICINE

## 2023-10-16 PROCEDURE — 80053 COMPREHEN METABOLIC PANEL: CPT | Performed by: INTERNAL MEDICINE

## 2023-10-16 PROCEDURE — 85025 COMPLETE CBC W/AUTO DIFF WBC: CPT | Performed by: INTERNAL MEDICINE

## 2023-10-16 PROCEDURE — 84443 ASSAY THYROID STIM HORMONE: CPT | Performed by: INTERNAL MEDICINE

## 2023-10-16 PROCEDURE — 87389 HIV-1 AG W/HIV-1&-2 AB AG IA: CPT | Performed by: INTERNAL MEDICINE

## 2023-11-15 ENCOUNTER — PATIENT MESSAGE (OUTPATIENT)
Dept: URGENT CARE | Facility: CLINIC | Age: 28
End: 2023-11-15
Payer: COMMERCIAL

## 2023-11-15 ENCOUNTER — OFFICE VISIT (OUTPATIENT)
Dept: URGENT CARE | Facility: CLINIC | Age: 28
End: 2023-11-15
Payer: COMMERCIAL

## 2023-11-15 VITALS
SYSTOLIC BLOOD PRESSURE: 137 MMHG | RESPIRATION RATE: 19 BRPM | TEMPERATURE: 98 F | BODY MASS INDEX: 37.05 KG/M2 | HEIGHT: 64 IN | HEART RATE: 80 BPM | WEIGHT: 217 LBS | OXYGEN SATURATION: 98 % | DIASTOLIC BLOOD PRESSURE: 87 MMHG

## 2023-11-15 DIAGNOSIS — R11.0 NAUSEA: ICD-10-CM

## 2023-11-15 DIAGNOSIS — R07.89 FEELING OF CHEST TIGHTNESS: Primary | ICD-10-CM

## 2023-11-15 LAB
B-HCG UR QL: NEGATIVE
BILIRUB UR QL STRIP: NEGATIVE
CTP QC/QA: YES
GLUCOSE UR QL STRIP: NEGATIVE
KETONES UR QL STRIP: POSITIVE
LEUKOCYTE ESTERASE UR QL STRIP: NEGATIVE
PH, POC UA: 6.5 (ref 5–8)
POC BLOOD, URINE: NEGATIVE
POC MOLECULAR INFLUENZA A AGN: NEGATIVE
POC MOLECULAR INFLUENZA B AGN: NEGATIVE
POC NITRATES, URINE: NEGATIVE
PROT UR QL STRIP: NEGATIVE
SARS-COV-2 AG RESP QL IA.RAPID: NEGATIVE
SP GR UR STRIP: 1.01 (ref 1–1.03)
UROBILINOGEN UR STRIP-ACNC: NORMAL (ref 0.1–1.1)

## 2023-11-15 PROCEDURE — 87502 POCT INFLUENZA A/B MOLECULAR: ICD-10-PCS | Mod: QW,S$GLB,,

## 2023-11-15 PROCEDURE — 81025 URINE PREGNANCY TEST: CPT | Mod: S$GLB,,,

## 2023-11-15 PROCEDURE — 81003 URINALYSIS AUTO W/O SCOPE: CPT | Mod: QW,S$GLB,,

## 2023-11-15 PROCEDURE — 87502 INFLUENZA DNA AMP PROBE: CPT | Mod: QW,S$GLB,,

## 2023-11-15 PROCEDURE — 71046 XR CHEST PA AND LATERAL: ICD-10-PCS | Mod: FY,S$GLB,, | Performed by: RADIOLOGY

## 2023-11-15 PROCEDURE — 99204 OFFICE O/P NEW MOD 45 MIN: CPT | Mod: S$GLB,,,

## 2023-11-15 PROCEDURE — 87811 SARS CORONAVIRUS 2 ANTIGEN POCT, MANUAL READ: ICD-10-PCS | Mod: QW,S$GLB,,

## 2023-11-15 PROCEDURE — 93010 ELECTROCARDIOGRAM REPORT: CPT | Mod: S$GLB,,, | Performed by: INTERNAL MEDICINE

## 2023-11-15 PROCEDURE — 93005 ELECTROCARDIOGRAM TRACING: CPT | Mod: S$GLB,,,

## 2023-11-15 PROCEDURE — 93005 EKG 12-LEAD: ICD-10-PCS | Mod: S$GLB,,,

## 2023-11-15 PROCEDURE — 93010 EKG 12-LEAD: ICD-10-PCS | Mod: S$GLB,,, | Performed by: INTERNAL MEDICINE

## 2023-11-15 PROCEDURE — 87811 SARS-COV-2 COVID19 W/OPTIC: CPT | Mod: QW,S$GLB,,

## 2023-11-15 PROCEDURE — 81003 POCT URINALYSIS, DIPSTICK, AUTOMATED, W/O SCOPE: ICD-10-PCS | Mod: QW,S$GLB,,

## 2023-11-15 PROCEDURE — 81025 POCT URINE PREGNANCY: ICD-10-PCS | Mod: S$GLB,,,

## 2023-11-15 PROCEDURE — 99204 PR OFFICE/OUTPT VISIT, NEW, LEVL IV, 45-59 MIN: ICD-10-PCS | Mod: S$GLB,,,

## 2023-11-15 PROCEDURE — 71046 X-RAY EXAM CHEST 2 VIEWS: CPT | Mod: FY,S$GLB,, | Performed by: RADIOLOGY

## 2023-11-15 RX ORDER — ALBUTEROL SULFATE 90 UG/1
2 AEROSOL, METERED RESPIRATORY (INHALATION) EVERY 6 HOURS PRN
Qty: 18 G | Refills: 0 | Status: SHIPPED | OUTPATIENT
Start: 2023-11-15 | End: 2024-03-13

## 2023-11-15 RX ORDER — ONDANSETRON 4 MG/1
4 TABLET, ORALLY DISINTEGRATING ORAL EVERY 8 HOURS PRN
Qty: 12 TABLET | Refills: 0 | Status: SHIPPED | OUTPATIENT
Start: 2023-11-15

## 2023-11-15 RX ORDER — ONDANSETRON 8 MG/1
8 TABLET, ORALLY DISINTEGRATING ORAL ONCE
Status: COMPLETED | OUTPATIENT
Start: 2023-11-15 | End: 2023-11-15

## 2023-11-15 RX ADMIN — ONDANSETRON 8 MG: 8 TABLET, ORALLY DISINTEGRATING ORAL at 01:11

## 2023-11-15 NOTE — PATIENT INSTRUCTIONS
All your tests came back within normal limits today. No abnormalities.  Please take Zofran as needed for nausea.  If you feel you are having shortness of breath or ongoing chest tightness, use albuterol inhaler.  If your symptoms still persist or worsens please follow-up in the emergency room as instructed for further evaluation.    You may have had one or more of the following signs:  Burning feeling in your throat, upper belly, or behind the chest bone  Fast heartbeat  Problems breathing like breathing fast, wheezing, coughing, feeling short of breath  Feeling faint or sweating  Anxiety  Pain in your shoulder, upper back, chest wall, or muscles. This may be due to lifting a heavy object.  Feeling of a lump or tightness of the food pipe in the chest  Upset stomach, belly pain, or throwing up  What care is needed at home?   Ask your doctor what you need to do when you go home. Make sure you ask questions if you do not understand what the doctor says. This way you will know what you need to do.  If your doctor tells you to use heat, put a heating pad on the painful part for no more than 20 minutes at a time. Never go to sleep with a heating pad on as this can cause burns.  If the chest pain is caused by coughing, using a cool mist humidifier may help your breathing.  What follow-up care is needed?   Your doctor may ask you to make visits to the office to check on your progress. Be sure to keep these visits. You may need to have more tests.  Monitor the pain. Pay attention to:  If there is more chest pain or the chest pain worsens  How often you have chest pain and when it happens  What type of pain it is. Is it throbbing, stabbing, heavy, cramping, or related to moving or breathing?  If eating is related to the pain  When do I need to call the doctor?   Activate the emergency medical system right away if you have signs of a heart attack. Call 911 in the [place] or [place]. The sooner treatment begins, the better  your chances for recovery. Call for emergency help right away if you have:  Signs of heart attack:  Chest pain  Trouble breathing  Fast heartbeat  Feeling dizzy  Call your doctor if:  The pain is not controlled or worsens  You have trouble breathing  You have fever, chills, or coughing up yellow-green mucus  You have problems swallowing  Your pain is due to stress and the stress cannot be relieved  You have a new chest pain that feels different from what you have had before  - Rest.    - Drink plenty of fluids.    - Acetaminophen (tylenol) or Ibuprofen (advil,motrin) as directed as needed for fever/pain. Avoid tylenol if you have a history of liver disease. Do not take ibuprofen if you have a history of GI bleeding, kidney disease, or if you take blood thinners.     - Follow up with your PCP or specialty clinic as directed in the next 1-2 weeks if not improved or as needed.  You can call [phone] to schedule an appointment with the appropriate provider.    - Go to the ER or seek medical attention immediately if you develop new or worsening symptoms.     - You must understand that you have received an Urgent Care treatment only and that you may be released before all of your medical problems are known or treated.   - You, the patient, will arrange for follow up care as instructed.   - If your condition worsens or fails to improve we recommend that you receive another evaluation at the ER immediately or contact your PCP to discuss your concerns or return here.

## 2023-11-15 NOTE — PROGRESS NOTES
"Subjective:      Patient ID: Moriah Lee is a 27 y.o. female.    Vitals:  height is 5' 4" (1.626 m) and weight is 98.4 kg (217 lb). Her oral temperature is 98 °F (36.7 °C). Her blood pressure is 137/87 and her pulse is 80. Her respiration is 19 and oxygen saturation is 98%.     Chief Complaint: Chest Pain (Chest Pain , headaches and blood pressure. - Entered by patient)    27-year-old female patient complain of chest tightness with nausea and feeling lethargic started last night.  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  Patient states she felt a little nauseous this morning but no vomiting.  No abdominal pain.  No fever, URI symptoms, or any other associated symptoms.  Patient denies any history of cardiac, anxiety, or respiratory.        Chest Pain   This is a new problem. The current episode started today. The onset quality is sudden. The problem occurs constantly. The problem has been gradually worsening. The pain is present in the substernal region. The pain is at a severity of 8/10 (6 right now). The pain is moderate. The quality of the pain is described as squeezing and crushing (Feels like someone is squeezing her heart to pump it). The pain does not radiate. Associated symptoms include malaise/fatigue and nausea. Pertinent negatives include no abdominal pain, back pain, claudication, cough, diaphoresis, dizziness, exertional chest pressure, fever, headaches, hemoptysis, irregular heartbeat, leg pain, lower extremity edema, near-syncope, numbness, orthopnea, palpitations, PND, shortness of breath, sputum production, syncope, vomiting or weakness. The pain is aggravated by nothing. Treatments tried: Tylenol. The treatment provided mild relief.   Pertinent negatives for past medical history " include no aneurysm, no anxiety/panic attacks, no aortic aneurysm, no aortic dissection, no arrhythmia, no bicuspid aortic valve, no CAD, no cancer, no congenital heart disease, no connective tissue disease, no COPD, no CHF, no diabetes, no DVT, no hyperhomocysteinemia, no hyperlipidemia, no hypertension, no Kawasaki disease, no Marfan's syndrome, no MI, no mitral valve prolapse, no pacemaker, no PE, no PVD, no recent injury, no rheumatic fever, no seizures, no sickle cell disease, no sleep apnea, no spontaneous pneumothorax, no stimulant use, no strokes, no thyroid problem, no TIA, Mendenhall syndrome and no valve disorder.   Her family medical history is significant for diabetes and hypertension.   Pertinent negatives for family medical history include: no aortic dissection, no CAD, no connective tissue disease, no heart disease, no hyperlipidemia, no Marfan's syndrome, no early MI, no PE, no PVD, no sickle cell disease, no stroke, no sudden death and no TIA.       Constitution: Negative for activity change, appetite change, chills, sweating, fatigue and fever.   HENT:  Negative for ear pain, ear discharge, foreign body in ear, tinnitus, hearing loss and dental problem.    Neck: Negative for neck pain, neck stiffness, painful lymph nodes and neck swelling.   Cardiovascular:  Negative for chest trauma, chest pain, leg swelling, palpitations, sob on exertion and passing out.   Eyes:  Negative for eye trauma, foreign body in eye, eye discharge, eye itching, eye pain and eye redness.   Respiratory:  Positive for chest tightness. Negative for sleep apnea, cough, sputum production, bloody sputum, COPD, shortness of breath, stridor, wheezing and asthma.    Gastrointestinal:  Positive for nausea. Negative for abdominal trauma, abdominal pain, abdominal bloating, history of abdominal surgery, vomiting, constipation and diarrhea.   Endocrine: hair loss, cold intolerance, heat intolerance and excessive thirst.   Genitourinary:   Negative for dysuria, frequency, urgency, urine decreased, flank pain and bladder incontinence.   Musculoskeletal:  Negative for pain, trauma, joint pain, joint swelling, abnormal ROM of joint, arthritis, gout and back pain.   Skin:  Negative for color change, pale, rash, wound, abrasion and laceration.   Allergic/Immunologic: Negative for environmental allergies, seasonal allergies, food allergies, eczema, asthma and immunocompromised state.   Neurological:  Negative for dizziness, history of vertigo, light-headedness, passing out, facial drooping, headaches, disorientation, altered mental status, numbness and seizures.   Hematologic/Lymphatic: Negative for swollen lymph nodes, easy bruising/bleeding, trouble clotting and history of blood clots. Does not bruise/bleed easily.   Psychiatric/Behavioral:  Negative for altered mental status, disorientation, confusion, agitation, nervous/anxious, sleep disturbance, hallucinations, homicidal ideas, suicidal ideas, self-injury and substance abuse. The patient is not nervous/anxious.       Objective:     Physical Exam   Constitutional: She is oriented to person, place, and time. She appears well-developed. She is cooperative.  Non-toxic appearance. She does not appear ill. No distress.   HENT:   Head: Normocephalic and atraumatic.   Ears:   Right Ear: Hearing, tympanic membrane, external ear and ear canal normal.   Left Ear: Hearing, tympanic membrane, external ear and ear canal normal.   Nose: Nose normal. No mucosal edema, rhinorrhea or nasal deformity. No epistaxis. Right sinus exhibits no maxillary sinus tenderness and no frontal sinus tenderness. Left sinus exhibits no maxillary sinus tenderness and no frontal sinus tenderness.   Mouth/Throat: Uvula is midline, oropharynx is clear and moist and mucous membranes are normal. No trismus in the jaw. Normal dentition. No uvula swelling. No oropharyngeal exudate, posterior oropharyngeal edema or posterior oropharyngeal  "erythema.   Eyes: Conjunctivae and lids are normal. No scleral icterus.   Neck: Trachea normal and phonation normal. Neck supple. No edema present. No erythema present. No neck rigidity present.   Cardiovascular: Normal rate, regular rhythm, normal heart sounds and normal pulses.   No murmur heard.Exam reveals no gallop and no friction rub.   Pulmonary/Chest: Effort normal and breath sounds normal. No stridor. No respiratory distress. She has no decreased breath sounds. She has no wheezes. She has no rhonchi. She has no rales. She exhibits no tenderness.   Abdominal: Normal appearance and bowel sounds are normal. She exhibits no distension and no mass. Soft. There is no abdominal tenderness. There is no rebound, no guarding, no left CVA tenderness and no right CVA tenderness. No hernia.   Musculoskeletal: Normal range of motion.         General: No deformity. Normal range of motion.   Neurological: She is alert and oriented to person, place, and time. She exhibits normal muscle tone. Coordination normal.   Skin: Skin is warm, dry, intact, not diaphoretic and not pale.   Psychiatric: Her speech is normal and behavior is normal. Judgment and thought content normal. She has a flat affect.      Comments: Flat affect. Patient does not show much emotion in room. Patient just keep stating "chest is tight." But not having any significant emotion of her symptom.    Nursing note and vitals reviewed.    XR CHEST PA AND LATERAL    Result Date: 11/15/2023  EXAMINATION: XR CHEST PA AND LATERAL CLINICAL HISTORY: Other chest pain TECHNIQUE: PA and lateral views of the chest were performed. COMPARISON: None FINDINGS: Heart size normal.  The lungs are clear.  No pleural effusion     See above Electronically signed by: John Han MD Date:    11/15/2023 Time:    13:31      Results for orders placed or performed in visit on 11/15/23   SARS Coronavirus 2 Antigen, POCT Manual Read   Result Value Ref Range    SARS Coronavirus 2 Antigen " "Negative Negative     Acceptable Yes    POCT Influenza A/B MOLECULAR   Result Value Ref Range    POC Molecular Influenza A Ag Negative Negative, Not Reported    POC Molecular Influenza B Ag Negative Negative, Not Reported     Acceptable Yes    POCT urine pregnancy   Result Value Ref Range    POC Preg Test, Ur Negative Negative     Acceptable Yes    POCT Urinalysis, Dipstick, Automated, W/O Scope   Result Value Ref Range    POC Blood, Urine Negative Negative    POC Bilirubin, Urine Negative Negative    POC Urobilinogen, Urine Normal 0.1 - 1.1    POC Ketones, Urine Positive (A) Negative    POC Protein, Urine Negative Negative    POC Nitrates, Urine Negative Negative    POC Glucose, Urine Negative Negative    pH, UA 6.5 5 - 8    POC Specific Gravity, Urine 1.015 1.003 - 1.029    POC Leukocytes, Urine Negative Negative      Assessment:     1. Feeling of chest tightness    2. Nausea        Plan:       Feeling of chest tightness  -     IN OFFICE EKG 12-LEAD (to [place])  -     XR CHEST PA AND LATERAL; Future; Expected date: 11/15/2023  -     albuterol (PROVENTIL HFA) 90 mcg/actuation inhaler; Inhale 2 puffs into the lungs every 6 (six) hours as needed for Wheezing (chest tightness). Rescue  Dispense: 18 g; Refill: 0    Nausea  -     SARS Coronavirus 2 Antigen, POCT Manual Read  -     POCT Influenza A/B MOLECULAR  -     POCT urine pregnancy  -     POCT Urinalysis, Dipstick, Automated, W/O Scope  -     ondansetron disintegrating tablet 8 mg  -     ondansetron (ZOFRAN-ODT) 4 MG TbDL; Take 1 tablet (4 mg total) by mouth every 8 (eight) hours as needed (nausea).  Dispense: 12 tablet; Refill: 0          Medical Decision Making:   Clinical Tests:   Medical Tests: Ordered and Reviewed  Urgent Care Management:  EKG Sinus Bradycardia (59 bpm). No STEMI. CXR- WNL.  Patient states she still has some "chest tightness" patient denies any history of anxiety, cardiac, or respiratory history.  No " "URI symptoms.  Patient reports mild improvement of nausea with Zofran.  Test in clinic today is reassuring.   Patient denies any family cardiac history.  Patient is not in any acute distress during visit today.  Patient has flat affect during encounter, not showing much emotion of her current symptom. Discussed with patient that if she has further concerns of her "chest tightness" she should go to the emergency room for further evaluation.  Patient is given strict ER precautions for further evaluation or worsening symptoms.  Patient verbalized understanding.      Additional MDM:     Heart Failure Score:   COPD = No          "

## 2023-11-17 ENCOUNTER — HOSPITAL ENCOUNTER (EMERGENCY)
Facility: HOSPITAL | Age: 28
Discharge: HOME | End: 2023-11-17
Attending: EMERGENCY MEDICINE
Payer: COMMERCIAL

## 2023-11-17 VITALS
TEMPERATURE: 98 F | RESPIRATION RATE: 16 BRPM | DIASTOLIC BLOOD PRESSURE: 65 MMHG | SYSTOLIC BLOOD PRESSURE: 104 MMHG | OXYGEN SATURATION: 99 % | BODY MASS INDEX: 37.05 KG/M2 | WEIGHT: 217 LBS | HEIGHT: 64 IN | HEART RATE: 75 BPM

## 2023-11-17 DIAGNOSIS — R07.89 CHEST TIGHTNESS: ICD-10-CM

## 2023-11-17 DIAGNOSIS — R07.89 CHEST DISCOMFORT: Primary | ICD-10-CM

## 2023-11-17 DIAGNOSIS — R51.9 NONINTRACTABLE HEADACHE, UNSPECIFIED CHRONICITY PATTERN, UNSPECIFIED HEADACHE TYPE: ICD-10-CM

## 2023-11-17 LAB
ALBUMIN SERPL BCP-MCNC: 3.9 G/DL (ref 3.5–5.2)
ALP SERPL-CCNC: 55 U/L (ref 55–135)
ALT SERPL W/O P-5'-P-CCNC: 10 U/L (ref 10–44)
ANION GAP SERPL CALC-SCNC: 8 MMOL/L (ref 8–16)
AST SERPL-CCNC: 16 U/L (ref 10–40)
B-HCG UR QL: NEGATIVE
BASOPHILS # BLD AUTO: 0.04 K/UL (ref 0–0.2)
BASOPHILS NFR BLD: 0.4 % (ref 0–1.9)
BILIRUB SERPL-MCNC: 0.2 MG/DL (ref 0.1–1)
BUN SERPL-MCNC: 15 MG/DL (ref 6–20)
CALCIUM SERPL-MCNC: 9.2 MG/DL (ref 8.7–10.5)
CHLORIDE SERPL-SCNC: 105 MMOL/L (ref 95–110)
CO2 SERPL-SCNC: 23 MMOL/L (ref 23–29)
CREAT SERPL-MCNC: 0.9 MG/DL (ref 0.5–1.4)
CTP QC/QA: YES
DIFFERENTIAL METHOD: ABNORMAL
EOSINOPHIL # BLD AUTO: 0.2 K/UL (ref 0–0.5)
EOSINOPHIL NFR BLD: 2 % (ref 0–8)
ERYTHROCYTE [DISTWIDTH] IN BLOOD BY AUTOMATED COUNT: 13 % (ref 11.5–14.5)
EST. GFR  (NO RACE VARIABLE): >60 ML/MIN/1.73 M^2
GLUCOSE SERPL-MCNC: 84 MG/DL (ref 70–110)
HCT VFR BLD AUTO: 37 % (ref 37–48.5)
HGB BLD-MCNC: 11.8 G/DL (ref 12–16)
IMM GRANULOCYTES # BLD AUTO: 0.03 K/UL (ref 0–0.04)
IMM GRANULOCYTES NFR BLD AUTO: 0.3 % (ref 0–0.5)
LYMPHOCYTES # BLD AUTO: 3.2 K/UL (ref 1–4.8)
LYMPHOCYTES NFR BLD: 30.4 % (ref 18–48)
MCH RBC QN AUTO: 27.6 PG (ref 27–31)
MCHC RBC AUTO-ENTMCNC: 31.9 G/DL (ref 32–36)
MCV RBC AUTO: 87 FL (ref 82–98)
MONOCYTES # BLD AUTO: 1.2 K/UL (ref 0.3–1)
MONOCYTES NFR BLD: 11.7 % (ref 4–15)
NEUTROPHILS # BLD AUTO: 5.7 K/UL (ref 1.8–7.7)
NEUTROPHILS NFR BLD: 55.2 % (ref 38–73)
NRBC BLD-RTO: 0 /100 WBC
PLATELET # BLD AUTO: 282 K/UL (ref 150–450)
PMV BLD AUTO: 11.8 FL (ref 9.2–12.9)
POTASSIUM SERPL-SCNC: 3.8 MMOL/L (ref 3.5–5.1)
PROT SERPL-MCNC: 7.7 G/DL (ref 6–8.4)
RBC # BLD AUTO: 4.27 M/UL (ref 4–5.4)
SODIUM SERPL-SCNC: 136 MMOL/L (ref 136–145)
TROPONIN I SERPL DL<=0.01 NG/ML-MCNC: <0.006 NG/ML (ref 0–0.03)
WBC # BLD AUTO: 10.35 K/UL (ref 3.9–12.7)

## 2023-11-17 PROCEDURE — 93010 ELECTROCARDIOGRAM REPORT: CPT | Mod: ,,, | Performed by: INTERNAL MEDICINE

## 2023-11-17 PROCEDURE — 93010 EKG 12-LEAD: ICD-10-PCS | Mod: ,,, | Performed by: INTERNAL MEDICINE

## 2023-11-17 PROCEDURE — 80053 COMPREHEN METABOLIC PANEL: CPT | Performed by: PHYSICIAN ASSISTANT

## 2023-11-17 PROCEDURE — 99285 EMERGENCY DEPT VISIT HI MDM: CPT | Mod: 25

## 2023-11-17 PROCEDURE — 81025 URINE PREGNANCY TEST: CPT | Performed by: PHYSICIAN ASSISTANT

## 2023-11-17 PROCEDURE — 85025 COMPLETE CBC W/AUTO DIFF WBC: CPT | Performed by: PHYSICIAN ASSISTANT

## 2023-11-17 PROCEDURE — 63600175 PHARM REV CODE 636 W HCPCS: Performed by: PHYSICIAN ASSISTANT

## 2023-11-17 PROCEDURE — 96361 HYDRATE IV INFUSION ADD-ON: CPT

## 2023-11-17 PROCEDURE — 25000003 PHARM REV CODE 250: Performed by: PHYSICIAN ASSISTANT

## 2023-11-17 PROCEDURE — 96374 THER/PROPH/DIAG INJ IV PUSH: CPT

## 2023-11-17 PROCEDURE — 93005 ELECTROCARDIOGRAM TRACING: CPT

## 2023-11-17 PROCEDURE — 84484 ASSAY OF TROPONIN QUANT: CPT | Performed by: PHYSICIAN ASSISTANT

## 2023-11-17 RX ORDER — LIDOCAINE HYDROCHLORIDE 20 MG/ML
5 SOLUTION OROPHARYNGEAL
Status: COMPLETED | OUTPATIENT
Start: 2023-11-17 | End: 2023-11-17

## 2023-11-17 RX ORDER — MAG HYDROX/ALUMINUM HYD/SIMETH 200-200-20
5 SUSPENSION, ORAL (FINAL DOSE FORM) ORAL
Status: COMPLETED | OUTPATIENT
Start: 2023-11-17 | End: 2023-11-17

## 2023-11-17 RX ORDER — METOCLOPRAMIDE HYDROCHLORIDE 5 MG/ML
10 INJECTION INTRAMUSCULAR; INTRAVENOUS
Status: COMPLETED | OUTPATIENT
Start: 2023-11-17 | End: 2023-11-17

## 2023-11-17 RX ADMIN — LIDOCAINE HYDROCHLORIDE 5 ML: 20 SOLUTION ORAL; TOPICAL at 09:11

## 2023-11-17 RX ADMIN — METOCLOPRAMIDE 10 MG: 5 INJECTION, SOLUTION INTRAMUSCULAR; INTRAVENOUS at 09:11

## 2023-11-17 RX ADMIN — SODIUM CHLORIDE, POTASSIUM CHLORIDE, SODIUM LACTATE AND CALCIUM CHLORIDE 1000 ML: 600; 310; 30; 20 INJECTION, SOLUTION INTRAVENOUS at 09:11

## 2023-11-17 RX ADMIN — ALUMINUM HYDROXIDE, MAGNESIUM HYDROXIDE, AND SIMETHICONE 5 ML: 200; 200; 20 SUSPENSION ORAL at 09:11

## 2023-11-17 NOTE — DISCHARGE INSTRUCTIONS
Your chest x-ray and blood tests did not show anything serious.  Follow-up with your primary doctor if your symptoms are not improving.  As discussed, you can take Maalox or Mylanta for your chest discomfort - your symptoms may be related to acid reflux or inflammation in your esophagus.    Return to the ER for any new or significantly worsening symptoms such as worsening chest pain, difficulty breathing, uncontrolled vomiting, new weakness or numbness in your arms or legs or any other worrisome symptoms.

## 2023-11-17 NOTE — ED PROVIDER NOTES
Encounter Date: 11/17/2023       History     Chief Complaint   Patient presents with    Chest Pain    Headache     Denies cardiac hx, nausea, seen at      27-year-old female with bipolar disorder, GERD presents to the ED with a chief complaint of chest pain.  Patient reports sharp chest pain also described as tightness for the past 2-3 days.  Pain is located in the middle of the chest.  She states that the chest pain is mostly constant.  Denies any specific alleviating or exacerbating factors.  Currently rates her pain 7/10.  Denies smoking history, cardiac history, family cardiac history.  She denies cough, fever, leg swelling, calf pain, history of VTE, recent prolonged immobilization.  She does use a birth control patch.  Additionally, patient complains of nausea and headache over the same duration.  She states that the headache is in the back of her head.  She states that the headaches began about a month ago, but have moved from across her forehead to the back of the head this week.  Seen by PCP about a month ago and prescribed Fioricet.  She has been taking Tylenol this week in place of the Fioricet and reports improvement in the headache.  Patient states that she was told to come to the ER if her chest pain continued prompting her visit today.      Review of patient's allergies indicates:   Allergen Reactions    Venom-honey bee Other (See Comments)     Past Medical History:   Diagnosis Date    Allergy     Anxiety     Bipolar disorder     Depression     GERD (gastroesophageal reflux disease)      History reviewed. No pertinent surgical history.  Family History   Problem Relation Age of Onset    Breast cancer Mother         anemia, eczema    Arthritis Mother     No Known Problems Father     ADD / ADHD Brother         asthma, depression    Asthma Brother     Diabetes Maternal Grandmother     No Known Problems Maternal Grandfather     Heart disease Paternal Grandmother     Cancer Paternal Grandmother     No  Known Problems Paternal Grandfather     Alcohol abuse Maternal Aunt     Alcohol abuse Maternal Uncle     Alcohol abuse Paternal Uncle     Alcohol abuse Paternal Aunt      Social History     Tobacco Use    Smoking status: Never     Passive exposure: Never    Smokeless tobacco: Never   Substance Use Topics    Alcohol use: Not Currently    Drug use: Never     Review of Systems   Constitutional:  Negative for fever.   Cardiovascular:  Positive for chest pain.   Gastrointestinal:  Positive for nausea.   Neurological:  Positive for headaches.       Physical Exam     Initial Vitals [11/17/23 0734]   BP Pulse Resp Temp SpO2   123/68 75 18 98.9 °F (37.2 °C) 99 %      MAP       --         Physical Exam    Nursing note and vitals reviewed.  Constitutional: She appears well-developed and well-nourished. She is not diaphoretic.  Non-toxic appearance. She does not appear ill. No distress.   HENT:   Head: Normocephalic and atraumatic.   Neck: Neck supple.   Cardiovascular:  Normal rate and regular rhythm.     Exam reveals no gallop and no friction rub.       No murmur heard.  Pulses:       Radial pulses are 2+ on the right side and 2+ on the left side.   Pulmonary/Chest: Effort normal and breath sounds normal. No accessory muscle usage. No tachypnea. No respiratory distress. She has no decreased breath sounds. She has no wheezes. She has no rhonchi. She has no rales.   She has some chest tenderness - unclear if this reproduces her reported complaint   Abdominal: Abdomen is soft. She exhibits no distension. There is no abdominal tenderness. There is no guarding.   Musculoskeletal:      Cervical back: Neck supple.     Neurological: She is alert.   Skin: No rash noted.   Psychiatric: Her behavior is normal.   Odd affect         ED Course   Procedures  Labs Reviewed   CBC W/ AUTO DIFFERENTIAL - Abnormal; Notable for the following components:       Result Value    Hemoglobin 11.8 (*)     MCHC 31.9 (*)     Mono # 1.2 (*)     All other  components within normal limits   COMPREHENSIVE METABOLIC PANEL   TROPONIN I   POCT URINE PREGNANCY        ECG Results              EKG 12-lead (Final result)  Result time 11/17/23 10:22:45      Final result by Geovanna, Lab In [facility] (11/17/23 10:22:45)                   Narrative:    Test Reason : R07.89,    Vent. Rate : 075 BPM     Atrial Rate : 075 BPM     P-R Int : 152 ms          QRS Dur : 096 ms      QT Int : 368 ms       P-R-T Axes : 029 013 016 degrees     QTc Int : 410 ms    Normal sinus rhythm  Right ventricular conduction delay  Borderline Abnormal ECG  When compared with ECG of 15-NOV-2023 12:08,  Nonspecific T wave abnormality now evident in Anterior leads  Confirmed by Abel CHOWDHURY MD ([id number]) on 11/17/2023 10:22:36 AM    Referred By: [email or web address]   SELF           Confirmed By:Abel CHOWDHURY MD                                  Imaging Results              X-Ray Chest PA And Lateral (Final result)  Result time 11/17/23 09:09:49      Final result by John Briceño MD (11/17/23 09:09:49)                   Impression:      No significant intrathoracic abnormality.  No significant detrimental interval change in the appearance of the chest since 11/15/2023 at 13:02 is appreciated.      Electronically signed by: John Briceño MD  Date:    11/17/2023  Time:    09:09               Narrative:    EXAMINATION:  XR CHEST PA AND LATERAL    TECHNIQUE:  Two views of the chest were obtained, with PA and lateral projections submitted.    COMPARISON:  Comparison is made to 11/15/2023 at 13:02.  Clinical information obtained from the electronic medical record indicates chest pain.    FINDINGS:  Heart size is normal, as is the appearance of the pulmonary vascularity.  Lung zones are clear, and are free of significant airspace consolidation or volume loss.  No pleural fluid.  No hilar or mediastinal mass lesion.  No pneumothorax.  Scoliosis convex to the right in the lower thoracic region is again incidentally observed.                                        Medications   LIDOcaine HCl 2% oral solution 5 mL (5 mLs Oral Given 11/17/23 0929)   aluminum-magnesium hydroxide-simethicone 200-200-20 mg/5 mL suspension 5 mL (5 mLs Oral Given 11/17/23 0927)   metoclopramide HCl injection 10 mg (10 mg Intravenous Given 11/17/23 0930)   lactated ringers bolus 1,000 mL (0 mLs Intravenous Stopped 11/17/23 1042)     Medical Decision Making  27-year-old female presents to the ED for evaluation of chest pain, headache, nausea for the past couple of days.  Vitals within normal limits.  Patient in no acute distress.  Well-appearing.  Afebrile.  My differential diagnosis includes but is not limited to:  GERD, esophagitis, anxiety, ACS.    Troponin is within normal limits.  Chest x-ray without acute process.  No other concerning lab abnormalities.  Very low suspicion for ACS.  Do not suspect PE.  Patient has normal vital signs.  She uses a birth control patch, but has no other risk factors.  Wells score 0.  May be GI in nature.  Patient already takes a PPI.  Discussed Maalox/Mylanta.  She does report some improvement in her symptoms after fluids, Maalox, viscous lidocaine, Reglan administration in the ED.  Feel that she can be discharged in stable condition with close PCP follow-up.  ED return precautions given.  She voiced understanding.     Amount and/or Complexity of Data Reviewed  Labs: ordered.  Radiology: ordered.    Risk  OTC drugs.  Prescription drug management.               ED Course as of 11/17/23 1049   Fri Nov 17, 2023   0815 EKG 12-lead  NSR rate 75.  Inverted T-wave in lead III. Seen on prior EKG on 11/15/23.  No additional prior EKGs for comparison.  [EH]      ED Course User Index  [EH] Kate Donahue PA-C                        Clinical Impression:  Final diagnoses:  [R07.89] Chest discomfort (Primary)  [R07.89] Chest tightness  [R51.9] Nonintractable headache, unspecified chronicity pattern, unspecified headache type          ED Disposition  Condition    Discharge Stable          ED Prescriptions    None       Follow-up Information    None          Kate Donahue, JOCELYN  11/17/23 7822

## 2023-11-17 NOTE — ED NOTES
Patient identifiers verified and correct for MS Lee  C/C: CP SEE NN  APPEARANCE: awake and alert in NAD. PAIN  7/10  SKIN: warm, dry and intact. No breakdown or bruising.  MUSCULOSKELETAL: Patient moving all extremities spontaneously, no obvious swelling or deformities noted. Ambulates independently.  RESPIRATORY: Denies shortness of breath.Respirations unlabored.   CARDIAC: Positive  CP, 2+ distal pulses; no peripheral edema  ABDOMEN: S/ND/NT, Denies nausea  : voids spontaneously, denies difficulty  Neurologic: AAO x 4; follows commands equal strength in all extremities; denies numbness/tingling. Denies dizziness reports headache

## 2024-01-15 PROBLEM — Z00.00 ANNUAL PHYSICAL EXAM: Status: RESOLVED | Noted: 2023-10-13 | Resolved: 2024-01-15

## 2024-02-02 ENCOUNTER — PATIENT MESSAGE (OUTPATIENT)
Dept: OBSTETRICS AND GYNECOLOGY | Facility: CLINIC | Age: 29
End: 2024-02-02
Payer: COMMERCIAL

## 2024-02-21 ENCOUNTER — PATIENT MESSAGE (OUTPATIENT)
Dept: ADMINISTRATIVE | Facility: HOSPITAL | Age: 29
End: 2024-02-21
Payer: COMMERCIAL

## 2024-03-12 ENCOUNTER — PATIENT MESSAGE (OUTPATIENT)
Dept: PRIMARY CARE CLINIC | Facility: CLINIC | Age: 29
End: 2024-03-12
Payer: COMMERCIAL

## 2024-03-12 DIAGNOSIS — G89.29 CHRONIC NONINTRACTABLE HEADACHE, UNSPECIFIED HEADACHE TYPE: Primary | ICD-10-CM

## 2024-03-12 DIAGNOSIS — R51.9 CHRONIC NONINTRACTABLE HEADACHE, UNSPECIFIED HEADACHE TYPE: Primary | ICD-10-CM

## 2024-03-13 ENCOUNTER — OFFICE VISIT (OUTPATIENT)
Dept: NEUROLOGY | Facility: CLINIC | Age: 29
End: 2024-03-13
Payer: COMMERCIAL

## 2024-03-13 VITALS
SYSTOLIC BLOOD PRESSURE: 133 MMHG | DIASTOLIC BLOOD PRESSURE: 91 MMHG | WEIGHT: 216 LBS | BODY MASS INDEX: 36.88 KG/M2 | HEIGHT: 64 IN | HEART RATE: 76 BPM

## 2024-03-13 DIAGNOSIS — G43.009 MIGRAINE WITHOUT AURA AND WITHOUT STATUS MIGRAINOSUS, NOT INTRACTABLE: Primary | ICD-10-CM

## 2024-03-13 PROCEDURE — 3075F SYST BP GE 130 - 139MM HG: CPT | Mod: CPTII,S$GLB,, | Performed by: PHYSICIAN ASSISTANT

## 2024-03-13 PROCEDURE — 3008F BODY MASS INDEX DOCD: CPT | Mod: CPTII,S$GLB,, | Performed by: PHYSICIAN ASSISTANT

## 2024-03-13 PROCEDURE — 99204 OFFICE O/P NEW MOD 45 MIN: CPT | Mod: S$GLB,,, | Performed by: PHYSICIAN ASSISTANT

## 2024-03-13 PROCEDURE — 1160F RVW MEDS BY RX/DR IN RCRD: CPT | Mod: CPTII,S$GLB,, | Performed by: PHYSICIAN ASSISTANT

## 2024-03-13 PROCEDURE — 3080F DIAST BP >= 90 MM HG: CPT | Mod: CPTII,S$GLB,, | Performed by: PHYSICIAN ASSISTANT

## 2024-03-13 PROCEDURE — 1159F MED LIST DOCD IN RCRD: CPT | Mod: CPTII,S$GLB,, | Performed by: PHYSICIAN ASSISTANT

## 2024-03-13 PROCEDURE — 99999 PR PBB SHADOW E&M-EST. PATIENT-LVL IV: CPT | Mod: PBBFAC,,, | Performed by: PHYSICIAN ASSISTANT

## 2024-03-13 RX ORDER — RIZATRIPTAN BENZOATE 5 MG/1
TABLET, ORALLY DISINTEGRATING ORAL
Qty: 12 TABLET | Refills: 2 | Status: SHIPPED | OUTPATIENT
Start: 2024-03-13 | End: 2024-05-15

## 2024-03-13 RX ORDER — AMITRIPTYLINE HYDROCHLORIDE 25 MG/1
25 TABLET, FILM COATED ORAL NIGHTLY
Qty: 30 TABLET | Refills: 2 | Status: SHIPPED | OUTPATIENT
Start: 2024-03-13 | End: 2024-05-15

## 2024-03-13 NOTE — PATIENT INSTRUCTIONS
Supplements for Migraine:  1. Magnesium Oxide - 400mg by mouth daily  2. Riboflavin (Vitamin B2) - 400mg by mouth daily  3. Coenzyme Q10 - 200mg tablet by mouth daily    - Please download Migraine [facility] on phone and begin tracking your headaches

## 2024-03-13 NOTE — PROGRESS NOTES
New Patient     SUBJECTIVE:  Patient ID: Moriah Lee   MRN: [id number]  Referred By: Dr. Jamaal Aguilar*  Chief Complaint: Headache      History of Present Illness:   28 y.o. female with migraines, anxiety, depression, GERD, bipolar d/o, h/o bells palsy 2015, who presents to clinic alone for evaluation of headaches.   PMHx negative for TBI, Meningitis, Aneurysms, Kidney Stones, asthma, GI bleed, osteoporosis, CAD/MI, CVA/TIA, DM, cancer  Family Hx negative for Migraines     Pt has a h/o  holguin's since childhood. She recalls seeing a neurologist in [place] in the 1st grade, obtaining an MRI brain w/ contrast that was reportedly normal, and being diagnosed w/ migraines. In the years since, she's taken naproxen (unsure of dosage) which helped. She's also tried tylenol, ibuprofen/advil which would help as well. Then in October, pt's ha's worsened and these otc's were only somewhat helpful prompting pt to visit pcp who prescribed fioricet which helped. [facility] then [facility]. [facility] Pt was subsequently referred to HA clinic to discuss further treatment regimens, pt is interested in preventative options. She is currently taking fioricet 4 days a week, once in a day. She further describes ha's as follows:  Location/Radiation - unilateral temporal, occiptal, michelle-orbital  Quality - pulsating, throbbing  Duration - few hours - 1 week  Intensity (range) - 5-10/10  Frequency - 30/30 ha days per month x 1 mo  Triggers - glasses, hunger, changes to sleep cycle, barometric pressure changes  Aggravating Factors - lights, sounds  Alleviating Factors - caffeine, tylenol, sleep, rest/relax, put head down  Recent Changes - none identified  Prodrome/Aura - no  Time of day of most headaches- anytime   Sleep - sleeping well  Associated symptoms with the headache: photophobia, nausea, phonohpobia, dizziness, eye watering. Denies all other ROS.     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    Current Medications:    Current Outpatient Medications:     fluticasone propionate (FLONASE) 50 mcg/actuation nasal spray, SPRAY 2 SPRAYS BY EACH NOSTRIL ROUTE ONCE DAILY., Disp: 48 mL, Rfl: 5    loratadine (CLARITIN) 10 mg tablet, TAKE 1 TABLET BY MOUTH EVERY DAY, Disp: 90 tablet, Rfl: 1    naproxen (NAPROSYN) 500 MG tablet, TAKE 1 TABLET BY MOUTH TWICE A DAY WITH MEALS, Disp: 60 tablet, Rfl: 1    norelgestromin-ethinyl estradiol (XULANE) 150-35 mcg/24 hr, Place 1 patch onto the skin once a week., Disp: 12 patch, Rfl: 3    ondansetron (ZOFRAN-ODT) 4 MG TbDL, Take 1 tablet (4 mg total) by mouth every 8 (eight) hours as needed (nausea)., Disp: 12 tablet, Rfl: 0    amitriptyline (ELAVIL) 25 MG tablet, Take 1 tablet (25 mg total) by mouth every evening., Disp: 30 tablet, Rfl: 2    omeprazole (PRILOSEC) 40 MG capsule, TAKE 1 CAPSULE (40 MG TOTAL) BY MOUTH BEFORE DINNER., Disp: 90 capsule, Rfl: 0    rizatriptan (MAXALT-MLT) 5 MG disintegrating tablet, Take at onset of migraine, can repeat in 2 hrs if needed.  No more than 2 tabs per day or 3 days/wk., Disp: 12 tablet, Rfl: 2    Review of Systems - as per HPI, otherwise a balanced 10 systems review is negative.    OBJECTIVE:  Vitals:  BP (!) 133/91 (BP Location: Right forearm, Patient Position: Sitting, BP Method: Medium (Automatic))   Pulse 76   Ht 5' 4" (1.626 m)   Wt 98 kg (216 lb)   BMI 37.08 kg/m²     Physical Exam   Constitutional: she appears well-developed and well-nourished. she is well groomed. NAD  HENT:    Head: Normocephalic and atraumatic, Frontalis was TTP, temporalis was TTP on right  Eyes: Conjunctivae and EOM are normal. Pupils are equal, round, and reactive to light   Neck: Neck supple. Occiput and trapezius TTP " on right  Musculoskeletal: Normal range of motion. No joint stiffness. No vertebral point tenderness.  Skin: Skin is warm and dry.  Psychiatric: Normal mood and affect.     Neuro exam:    Mental status:  The patient is alert and oriented to person, place and time.  Language is intact and fluent  Remote and recent memory are intact  Normal attention and concentration  Mood is stable    Cranial Nerves:  Pupils are equal and reactive to light.    Extraocular movements are intact and without nystagmus.    Facial movement is symmetric.  Facial sensation is intact.    Hearing is intact   FROM of neck in all (6) directions without pain  Shoulder shrug symmetrical.    Coordination:     Finger to nose - normal and symmetric bilaterally     Motor:  Normal muscle bulk and symmetry. No fasciculations were noted.   Tremor not apparent   Pronator drift not apparent.    strength was strong and symmetric  RUE:appropriate against gravity and medium force as tested 5/5  LUE: appropriate against gravity and medium force as tested 5/5  RLE:appropriate against gravity and medium force as tested 5/5              LLE: appropriate against gravity and medium force as tested 5/5    Sensory:  RUE  intact light touch  LUE intact light touch  RLE intact light touch  LLE intact light touch    Gait:   Normal gait    Review of Data:   Labs:  No visits with results within 3 Month(s) from this visit.   Latest known visit with results is:   Admission on 11/17/2023, Discharged on 11/17/2023   Component Date Value Ref Range Status    WBC 11/17/2023 10.35  3.90 - 12.70 K/uL Final    RBC 11/17/2023 4.27  4.00 - 5.40 M/uL Final    Hemoglobin 11/17/2023 11.8 (L)  12.0 - 16.0 g/dL Final    Hematocrit 11/17/2023 37.0  37.0 - 48.5 % Final    MCV 11/17/2023 87  82 - 98 fL Final    MCH 11/17/2023 27.6  27.0 - 31.0 pg Final    MCHC 11/17/2023 31.9 (L)  32.0 - 36.0 g/dL Final    RDW 11/17/2023 13.0  11.5 - 14.5 % Final    Platelets 11/17/2023 282  150 - 450  K/uL Final    MPV 11/17/2023 11.8  9.2 - 12.9 fL Final    Immature Granulocytes 11/17/2023 0.3  0.0 - 0.5 % Final    Gran # (ANC) 11/17/2023 5.7  1.8 - 7.7 K/uL Final    Immature Grans (Abs) 11/17/2023 0.03  0.00 - 0.04 K/uL Final    Lymph # 11/17/2023 3.2  1.0 - 4.8 K/uL Final    Mono # 11/17/2023 1.2 (H)  0.3 - 1.0 K/uL Final    Eos # 11/17/2023 0.2  0.0 - 0.5 K/uL Final    Baso # 11/17/2023 0.04  0.00 - 0.20 K/uL Final    nRBC 11/17/2023 0  0 /100 WBC Final    Gran % 11/17/2023 55.2  38.0 - 73.0 % Final    Lymph % 11/17/2023 30.4  18.0 - 48.0 % Final    Mono % 11/17/2023 11.7  4.0 - 15.0 % Final    Eosinophil % 11/17/2023 2.0  0.0 - 8.0 % Final    Basophil % 11/17/2023 0.4  0.0 - 1.9 % Final    Differential Method 11/17/2023 Automated   Final    Sodium 11/17/2023 136  136 - 145 mmol/L Final    Potassium 11/17/2023 3.8  3.5 - 5.1 mmol/L Final    Chloride 11/17/2023 105  95 - 110 mmol/L Final    CO2 11/17/2023 23  23 - 29 mmol/L Final    Glucose 11/17/2023 84  70 - 110 mg/dL Final    BUN 11/17/2023 15  6 - 20 mg/dL Final    Creatinine 11/17/2023 0.9  0.5 - 1.4 mg/dL Final    Calcium 11/17/2023 9.2  8.7 - 10.5 mg/dL Final    Total Protein 11/17/2023 7.7  6.0 - 8.4 g/dL Final    Albumin 11/17/2023 3.9  3.5 - 5.2 g/dL Final    Total Bilirubin 11/17/2023 0.2  0.1 - 1.0 mg/dL Final    Alkaline Phosphatase 11/17/2023 55  55 - 135 U/L Final    AST 11/17/2023 16  10 - 40 U/L Final    ALT 11/17/2023 10  10 - 44 U/L Final    eGFR 11/17/2023 >60.0  >60 mL/min/1.73 m^2 Final    Anion Gap 11/17/2023 8  8 - 16 mmol/L Final    Troponin I 11/17/2023 <0.006  0.000 - 0.026 ng/mL Final    POC Preg Test, Ur 11/17/2023 Negative  Negative Final     Acceptable 11/17/2023 Yes   Final     Imaging:  No results found for this or any previous visit.  Note: I have independently reviewed any/all imaging/labs/tests and agree with the report (s) as documented.  Any discrepancies will be as noted/demarcated by free text.  JOCELYN LEHMAN  3/13/2024    ASSESSMENT:  1. Migraine without aura and without status migrainosus, not intractable          PLAN:  - Discussed symptoms appear to be consistent with migraines, discussed treatment options and patient agreed with the following plan:  - ppx - start elavil 25mg qhs  - abortive - try maxalt 5mg prn, d/c fioricet and all other otcs to avoid rebound ha's  - nausea - continue zofran 4mg odt prn  - risks, benefits, and potential side effects of elavil, maxalt discussed   - alternative treatment options offered   - importance of healthy diet, regular exercise and sleep hygiene in the treatment of headaches    - Start tracking headaches via Migraine [facility] on phone   - RTC in 3 mo     Orders Placed This Encounter    rizatriptan (MAXALT-MLT) 5 MG disintegrating tablet    amitriptyline (ELAVIL) 25 MG tablet       I have discussed realistic goals of care with patient at length as well as medication options, and need for lifestyle adjustment. I have explained that treatment will take time. We have agreed that the goal will be to reduce frequency/intensity/quantity of HA, not to be completely HA free. I have explained my non narcotic policy regarding headache treatment.    Patient agreeable to work on lifestyle adjustments.    Discussed potential for teratogenicity with treatment, patient understands if her family planning status should change she will contact office immediately and we will safely adjust medications as needed.     Questions and concerns were sought and answered to the patient's stated verbal satisfaction.  The patient verbalizes understanding and agreement with the above stated treatment plan.     CC: Jamaal Leyva MD Sarena Patel, PA-C  [facility]  [phone]    Dr. Quijano was available during today's encounter.

## 2024-03-28 ENCOUNTER — OFFICE VISIT (OUTPATIENT)
Dept: OBSTETRICS AND GYNECOLOGY | Facility: CLINIC | Age: 29
End: 2024-03-28
Payer: COMMERCIAL

## 2024-03-28 VITALS
DIASTOLIC BLOOD PRESSURE: 84 MMHG | SYSTOLIC BLOOD PRESSURE: 117 MMHG | BODY MASS INDEX: 36.14 KG/M2 | WEIGHT: 210.56 LBS

## 2024-03-28 DIAGNOSIS — Z01.419 WELL WOMAN EXAM WITH ROUTINE GYNECOLOGICAL EXAM: Primary | ICD-10-CM

## 2024-03-28 DIAGNOSIS — Z11.3 ROUTINE SCREENING FOR STI (SEXUALLY TRANSMITTED INFECTION): ICD-10-CM

## 2024-03-28 DIAGNOSIS — R10.2 PELVIC CRAMPING: ICD-10-CM

## 2024-03-28 DIAGNOSIS — Z30.09 ENCOUNTER FOR COUNSELING REGARDING CONTRACEPTION: ICD-10-CM

## 2024-03-28 PROCEDURE — 87491 CHLMYD TRACH DNA AMP PROBE: CPT | Performed by: STUDENT IN AN ORGANIZED HEALTH CARE EDUCATION/TRAINING PROGRAM

## 2024-03-28 PROCEDURE — 88175 CYTOPATH C/V AUTO FLUID REDO: CPT | Performed by: STUDENT IN AN ORGANIZED HEALTH CARE EDUCATION/TRAINING PROGRAM

## 2024-03-28 PROCEDURE — 3008F BODY MASS INDEX DOCD: CPT | Mod: CPTII,S$GLB,, | Performed by: STUDENT IN AN ORGANIZED HEALTH CARE EDUCATION/TRAINING PROGRAM

## 2024-03-28 PROCEDURE — 1159F MED LIST DOCD IN RCRD: CPT | Mod: CPTII,S$GLB,, | Performed by: STUDENT IN AN ORGANIZED HEALTH CARE EDUCATION/TRAINING PROGRAM

## 2024-03-28 PROCEDURE — 3074F SYST BP LT 130 MM HG: CPT | Mod: CPTII,S$GLB,, | Performed by: STUDENT IN AN ORGANIZED HEALTH CARE EDUCATION/TRAINING PROGRAM

## 2024-03-28 PROCEDURE — 81514 NFCT DS BV&VAGINITIS DNA ALG: CPT | Performed by: STUDENT IN AN ORGANIZED HEALTH CARE EDUCATION/TRAINING PROGRAM

## 2024-03-28 PROCEDURE — 3079F DIAST BP 80-89 MM HG: CPT | Mod: CPTII,S$GLB,, | Performed by: STUDENT IN AN ORGANIZED HEALTH CARE EDUCATION/TRAINING PROGRAM

## 2024-03-28 PROCEDURE — 99999 PR PBB SHADOW E&M-EST. PATIENT-LVL III: CPT | Mod: PBBFAC,,, | Performed by: STUDENT IN AN ORGANIZED HEALTH CARE EDUCATION/TRAINING PROGRAM

## 2024-03-28 PROCEDURE — 99395 PREV VISIT EST AGE 18-39: CPT | Mod: S$GLB,,, | Performed by: STUDENT IN AN ORGANIZED HEALTH CARE EDUCATION/TRAINING PROGRAM

## 2024-03-28 RX ORDER — NORELGESTROMIN AND ETHINYL ESTRADIOL 35; 150 UG/MG; UG/MG
1 PATCH TRANSDERMAL WEEKLY
Qty: 12 PATCH | Refills: 4 | Status: SHIPPED | OUTPATIENT
Start: 2024-03-28 | End: 2025-03-28

## 2024-03-28 NOTE — PROGRESS NOTES
CC: Well woman exam    HPI:  Moriah Lee is a 28 y.o. female No obstetric history on file. presents for a well woman exam.  She reports continued long periods even with the patches, some times she has bleeding for one week on/one week off, then had one period that lasted 20 days. Is using patches three week then taking off one week for the period.       Patient history:   Past Medical History:   Diagnosis Date    Allergy     Anxiety     Bipolar disorder     Depression     GERD (gastroesophageal reflux disease)      History reviewed. No pertinent surgical history.  OB History   No obstetric history on file.       GYN  Menopausal: No  History of abnormal paps: DENIES  Abnormal or postmenopausal bleeding:  prolonged periods  History of abnormal mammograms:N/A   Family history of breast or ovarian cancer:  breast  Any breast masses, pain, skin changes, or nipple discharge: DENIES  Possible recent STD exposure: denies  Contraception: Patch    Pap: 3/28/2024, Done today  Mammogram: N/A      Family History   Problem Relation Age of Onset    Breast cancer Mother         anemia, eczema    Arthritis Mother     No Known Problems Father     ADD / ADHD Brother         asthma, depression    Asthma Brother     Diabetes Maternal Grandmother     No Known Problems Maternal Grandfather     Heart disease Paternal Grandmother     Cancer Paternal Grandmother     No Known Problems Paternal Grandfather     Alcohol abuse Maternal Aunt     Alcohol abuse Maternal Uncle     Alcohol abuse Paternal Uncle     Alcohol abuse Paternal Aunt      Social History     Tobacco Use    Smoking status: Never     Passive exposure: Never    Smokeless tobacco: Never   Substance Use Topics    Alcohol use: Not Currently    Drug use: Never     Allergies: Venom-honey bee    Current Outpatient Medications:     amitriptyline (ELAVIL) 25 MG tablet, Take 1 tablet (25 mg total) by mouth every evening., Disp: 30 tablet, Rfl: 2    fluticasone propionate (FLONASE) 50  mcg/actuation nasal spray, SPRAY 2 SPRAYS BY EACH NOSTRIL ROUTE ONCE DAILY., Disp: 48 mL, Rfl: 5    loratadine (CLARITIN) 10 mg tablet, TAKE 1 TABLET BY MOUTH EVERY DAY, Disp: 90 tablet, Rfl: 1    naproxen (NAPROSYN) 500 MG tablet, TAKE 1 TABLET BY MOUTH TWICE A DAY WITH MEALS, Disp: 60 tablet, Rfl: 1    ondansetron (ZOFRAN-ODT) 4 MG TbDL, Take 1 tablet (4 mg total) by mouth every 8 (eight) hours as needed (nausea)., Disp: 12 tablet, Rfl: 0    rizatriptan (MAXALT-MLT) 5 MG disintegrating tablet, Take at onset of migraine, can repeat in 2 hrs if needed.  No more than 2 tabs per day or 3 days/wk., Disp: 12 tablet, Rfl: 2    norelgestromin-ethinyl estradiol (XULANE) 150-35 mcg/24 hr, Place 1 patch onto the skin once a week. Use continuously, skip period week, Disp: 12 patch, Rfl: 4    omeprazole (PRILOSEC) 40 MG capsule, TAKE 1 CAPSULE (40 MG TOTAL) BY MOUTH BEFORE DINNER., Disp: 90 capsule, Rfl: 0       ROS:  GENERAL: Denies weight gain or weight loss. Feeling well overall.   SKIN: Denies rash or lesions.   HEAD: Denies head injury or headache.   NODES: Denies enlarged lymph nodes.   CHEST: Denies chest pain or shortness of breath.   CARDIOVASCULAR: Denies palpitations or left sided chest pain.   ABDOMEN: No abdominal pain, constipation, diarrhea, nausea, vomiting or rectal bleeding.   URINARY: No frequency, dysuria, hematuria, or burning on urination.  REPRODUCTIVE: See HPI.   BREASTS: The patient performs breast self-examination and denies pain, lumps, or nipple discharge.   HEMATOLOGIC: No easy bruisability or excessive bleeding.  MUSCULOSKELETAL: Denies joint pain or swelling.   NEUROLOGIC: Denies syncope or weakness.   PSYCHIATRIC: Denies depression, anxiety or mood swings.    Objective:   /84   Wt 95.5 kg (210 lb 8.6 oz)   LMP 03/16/2024 (Exact Date)   BMI 36.14 kg/m²       Physical Exam:  APPEARANCE: Well nourished, well developed, in no acute distress.  AFFECT: WNL, alert and oriented x 3  SKIN: No  acne or hirsutism  NECK: Neck symmetric without masses or thyromegaly  CHEST: Good respiratory effect  ABDOMEN: Soft.  No tenderness or masses.  No hepatosplenomegaly.  No hernias.  BREASTS: Symmetrical, no skin changes or visible lesions.  No palpable masses, nipple discharge bilaterally.  PELVIC: Normal external genitalia without lesions.  Normal hair distribution.  Adequate perineal body, normal urethral meatus.  Vagina moist and well rugated without lesions or discharge.  Cervix pink, without lesions, discharge or tenderness.  No significant cystocele or rectocele.  Bimanual exam shows uterus to be normal size, regular, mobile and nontender.  Adnexa without masses or tenderness.   EXTREMITIES: No edema.    ASSESSMENT AND PLAN  1. Well woman exam with routine gynecological exam  Liquid-Based Pap Smear, Screening      2. Routine screening for STI (sexually transmitted infection)  C. trachomatis/N. gonorrhoeae by AMP DNA [facility]; Vagina    Vaginosis Screen by DNA Probe      3. Encounter for counseling regarding contraception  norelgestromin-ethinyl estradiol (XULANE) 150-35 mcg/24 hr      4. Pelvic cramping  norelgestromin-ethinyl estradiol (XULANE) 150-35 mcg/24 hr          Annual exam  Breast and pelvic exam: wnl  Patient counseled on ASCCP guidelines for cervical cytology screening  Cervical screening: done today   Patient counseled on current recommendations for breast cancer screening  Mammogram screening: at 40  STD testing: completed today per patient request  Contraception: discussed option to try OCP, progesterone only pills, nexplanon or IUD instead for bleeding. Also discussed continuous use patches. Patient would like to try continuous use patches, will consider if she wants to do US/labs in future to evaluate bleeding   Osteoporosis screening at 65      She was counseled to follow up with her PCP for other routine health maintenance      Follow up in about 1 year (around 3/28/2025).      Hollie  Heaney, MD OBGYN [facility]

## 2024-03-30 LAB
BACTERIAL VAGINOSIS DNA: POSITIVE
C TRACH DNA SPEC QL NAA+PROBE: NOT DETECTED
CANDIDA GLABRATA DNA: NEGATIVE
CANDIDA KRUSEI DNA: NEGATIVE
CANDIDA RRNA VAG QL PROBE: NEGATIVE
N GONORRHOEA DNA SPEC QL NAA+PROBE: NOT DETECTED
T VAGINALIS RRNA GENITAL QL PROBE: NEGATIVE

## 2024-04-03 RX ORDER — METRONIDAZOLE 500 MG/1
500 TABLET ORAL EVERY 12 HOURS
Qty: 14 TABLET | Refills: 0 | Status: SHIPPED | OUTPATIENT
Start: 2024-04-03 | End: 2024-04-10

## 2024-04-05 LAB
CLINICAL INFO: ABNORMAL
CYTO CVX: ABNORMAL
CYTOLOGIST CVX/VAG CYTO: ABNORMAL
CYTOLOGIST CVX/VAG CYTO: ABNORMAL
CYTOLOGY CMNT CVX/VAG CYTO-IMP: ABNORMAL
CYTOLOGY PAP THIN PREP EXPLANATION: ABNORMAL
DATE OF PREVIOUS PAP: ABNORMAL
DATE PREVIOUS BX: ABNORMAL
GEN CATEG CVX/VAG CYTO-IMP: ABNORMAL
LMP START DATE: ABNORMAL
MICROORGANISM CVX/VAG CYTO: ABNORMAL
PATHOLOGIST CVX/VAG CYTO: ABNORMAL
SERVICE CMNT-IMP: ABNORMAL
SPECIMEN SOURCE CVX/VAG CYTO: ABNORMAL
STAT OF ADQ CVX/VAG CYTO-IMP: ABNORMAL

## 2024-04-25 ENCOUNTER — OFFICE VISIT (OUTPATIENT)
Dept: OBSTETRICS AND GYNECOLOGY | Facility: CLINIC | Age: 29
End: 2024-04-25
Payer: COMMERCIAL

## 2024-04-25 VITALS — DIASTOLIC BLOOD PRESSURE: 91 MMHG | BODY MASS INDEX: 36.48 KG/M2 | WEIGHT: 212.5 LBS | SYSTOLIC BLOOD PRESSURE: 129 MMHG

## 2024-04-25 DIAGNOSIS — R87.612 LGSIL ON PAP SMEAR OF CERVIX: Primary | ICD-10-CM

## 2024-04-25 PROCEDURE — 99499 UNLISTED E&M SERVICE: CPT | Mod: S$GLB,,, | Performed by: STUDENT IN AN ORGANIZED HEALTH CARE EDUCATION/TRAINING PROGRAM

## 2024-04-25 PROCEDURE — 88305 TISSUE EXAM BY PATHOLOGIST: CPT | Mod: 59 | Performed by: PATHOLOGY

## 2024-04-25 PROCEDURE — 57454 BX/CURETT OF CERVIX W/SCOPE: CPT | Mod: S$GLB,,, | Performed by: STUDENT IN AN ORGANIZED HEALTH CARE EDUCATION/TRAINING PROGRAM

## 2024-04-25 PROCEDURE — 99999 PR PBB SHADOW E&M-EST. PATIENT-LVL III: CPT | Mod: PBBFAC,,, | Performed by: STUDENT IN AN ORGANIZED HEALTH CARE EDUCATION/TRAINING PROGRAM

## 2024-04-25 PROCEDURE — 88305 TISSUE EXAM BY PATHOLOGIST: CPT | Mod: 26,,, | Performed by: PATHOLOGY

## 2024-04-25 NOTE — PROCEDURES
Colposcopy    Date/Time: 4/25/2024 1:45 PM    Performed by: Hollie Bedolla MD  Authorized by: Hollie Bedolla MD    Consent obatined:  Prior to procedure the appropriate consent was completed and verified    Colposcopy Site:  Cervix  Position:  Supine  Acrowhite Lesion? Yes    Atypical Vessels: No    Transformation Zone Adequate?: No    Biopsy?: Yes         Location:  Cervix ((6 00))  ECC Performed?: Yes    LEEP Performed?: No    Estimated blood loss (cc):  0   Patient tolerated the procedure well with no immediate complications.   Post-operative instructions were provided for the patient.   Patient was discharged and will follow up if any complications occur

## 2024-04-25 NOTE — PROGRESS NOTES
CC: abnormal pap, here for colposcopy     HPI:  Moriah Lee is a 28 y.o. female No obstetric history on file.  presents for colposcopy.  Patient's last menstrual period was 03/16/2024 (exact date)..  Her most recent pap smear shows low-grade squamous intraepithelial neoplasia (LGSIL - encompassing HPV,mild dysplasia,KARON I).      ROS:  GENERAL: Denies weight gain or weight loss. Feeling well overall.   SKIN: Denies rash or lesions.   HEAD: Denies head injury or headache.   NODES: Denies enlarged lymph nodes.   CHEST: Denies chest pain or shortness of breath.   CARDIOVASCULAR: Denies palpitations or left sided chest pain.   ABDOMEN: No abdominal pain, constipation, diarrhea, nausea, vomiting or rectal bleeding.   URINARY: No frequency, dysuria, hematuria, or burning on urination.  REPRODUCTIVE: See HPI.   BREASTS: The patient performs breast self-examination and denies pain, lumps, or nipple discharge.   HEMATOLOGIC: No easy bruisability or excessive bleeding.  MUSCULOSKELETAL: Denies joint pain or swelling.   NEUROLOGIC: Denies syncope or weakness.   PSYCHIATRIC: Denies depression, anxiety or mood swings.    Objective:   BP (!) 129/91   Wt 96.4 kg (212 lb 8.4 oz)   LMP 03/16/2024 (Exact Date)   BMI 36.48 kg/m²       Physical Exam:  APPEARANCE: Well nourished, well developed, in no acute distress.  AFFECT: WNL, alert and oriented x 3  PELVIC: Normal external genitalia without lesions.  Normal hair distribution.  Adequate perineal body, normal urethral meatus.  Vagina moist and well rugated without lesions or discharge.  Cervix pink, without discharge or tenderness. Colpo with biopsy, ECC completed today      Assessment and Plan:    Abnormal pap smear: LGSIL  - The abnormal test findings and HPV infection were discussed. Patient counseled on the need for colposcopy and possible biopsies/ECC to determine further indicated treatments. Discussed minimal risk of bleeding and infection with colposcopy, and  alternatives to colposcopy and patient agreed to proceed.    - Post-colposcopy counseling completed including: manage post-colposcopy cramping with NSAIDs or Tylenol.  Avoid intercourse, douching, or tampons in the vagina for at least 2-3 days.  Expect a clumpy blackish discharge due to Monsel's solution application for several days.  Report heavy bleeding, worsening pain or pain that does not respond to above medications, or foul-smelling vaginal discharge.   - HPV vaccine recommended according to FDA age guidelines.   - Follow up pending colposcopy results.      Hollie Bedolla MD  OBGYN [facility]

## 2024-04-30 LAB
FINAL PATHOLOGIC DIAGNOSIS: NORMAL
GROSS: NORMAL
Lab: NORMAL

## 2024-05-13 ENCOUNTER — HOSPITAL ENCOUNTER (EMERGENCY)
Facility: HOSPITAL | Age: 29
Discharge: HOME | End: 2024-05-13
Attending: EMERGENCY MEDICINE
Payer: COMMERCIAL

## 2024-05-13 VITALS
SYSTOLIC BLOOD PRESSURE: 128 MMHG | OXYGEN SATURATION: 98 % | RESPIRATION RATE: 16 BRPM | WEIGHT: 210 LBS | BODY MASS INDEX: 35.85 KG/M2 | HEART RATE: 70 BPM | DIASTOLIC BLOOD PRESSURE: 72 MMHG | TEMPERATURE: 98 F | HEIGHT: 64 IN

## 2024-05-13 DIAGNOSIS — R51.9 ACUTE NONINTRACTABLE HEADACHE, UNSPECIFIED HEADACHE TYPE: Primary | ICD-10-CM

## 2024-05-13 LAB
B-HCG UR QL: NEGATIVE
CTP QC/QA: YES

## 2024-05-13 PROCEDURE — 96366 THER/PROPH/DIAG IV INF ADDON: CPT

## 2024-05-13 PROCEDURE — 96375 TX/PRO/DX INJ NEW DRUG ADDON: CPT

## 2024-05-13 PROCEDURE — 99284 EMERGENCY DEPT VISIT MOD MDM: CPT | Mod: 25

## 2024-05-13 PROCEDURE — 96361 HYDRATE IV INFUSION ADD-ON: CPT

## 2024-05-13 PROCEDURE — 63600175 PHARM REV CODE 636 W HCPCS: Performed by: PHYSICIAN ASSISTANT

## 2024-05-13 PROCEDURE — 96365 THER/PROPH/DIAG IV INF INIT: CPT

## 2024-05-13 PROCEDURE — 81025 URINE PREGNANCY TEST: CPT | Performed by: PHYSICIAN ASSISTANT

## 2024-05-13 PROCEDURE — 25000003 PHARM REV CODE 250: Performed by: PHYSICIAN ASSISTANT

## 2024-05-13 RX ORDER — METOCLOPRAMIDE HYDROCHLORIDE 5 MG/ML
10 INJECTION INTRAMUSCULAR; INTRAVENOUS
Status: COMPLETED | OUTPATIENT
Start: 2024-05-13 | End: 2024-05-13

## 2024-05-13 RX ORDER — MAGNESIUM SULFATE HEPTAHYDRATE 40 MG/ML
2 INJECTION, SOLUTION INTRAVENOUS ONCE
Status: COMPLETED | OUTPATIENT
Start: 2024-05-13 | End: 2024-05-13

## 2024-05-13 RX ORDER — KETOROLAC TROMETHAMINE 30 MG/ML
10 INJECTION, SOLUTION INTRAMUSCULAR; INTRAVENOUS
Status: COMPLETED | OUTPATIENT
Start: 2024-05-13 | End: 2024-05-13

## 2024-05-13 RX ORDER — ACETAMINOPHEN 325 MG/1
650 TABLET ORAL
Status: COMPLETED | OUTPATIENT
Start: 2024-05-13 | End: 2024-05-13

## 2024-05-13 RX ADMIN — KETOROLAC TROMETHAMINE 10 MG: 30 INJECTION, SOLUTION INTRAMUSCULAR at 08:05

## 2024-05-13 RX ADMIN — METOCLOPRAMIDE 10 MG: 5 INJECTION, SOLUTION INTRAMUSCULAR; INTRAVENOUS at 08:05

## 2024-05-13 RX ADMIN — MAGNESIUM SULFATE HEPTAHYDRATE 2 G: 40 INJECTION, SOLUTION INTRAVENOUS at 09:05

## 2024-05-13 RX ADMIN — SODIUM CHLORIDE, POTASSIUM CHLORIDE, SODIUM LACTATE AND CALCIUM CHLORIDE 1000 ML: 600; 310; 30; 20 INJECTION, SOLUTION INTRAVENOUS at 08:05

## 2024-05-13 RX ADMIN — ACETAMINOPHEN 650 MG: 325 TABLET ORAL at 08:05

## 2024-05-13 NOTE — DISCHARGE INSTRUCTIONS
You had a normal neurological exam today.  If you continue to have headaches please follow-up with your primary care doctor or neurologist.  You may return to ED sooner if you develop any new or worsening symptoms.

## 2024-05-13 NOTE — ED NOTES
Patient identifiers verified and correct for Moriah Lee  LOC: The patient is awake, alert and aware of environment with an appropriate affect, the patient is oriented x 3 and speaking appropriately. Pt reports headache  APPEARANCE: Patient appears comfortable and in no acute distress, patient is clean and well groomed.  SKIN: The skin is warm and dry, color consistent with ethnicity, patient has normal skin turgor and moist mucus membranes, skin intact, no breakdown or bruising noted.   MUSCULOSKELETAL: Patient moving all extremities spontaneously, no swelling noted.  RESPIRATORY: Airway is open and patent, respirations are spontaneous, patient has a normal effort and rate, no accessory muscle use noted, O2 Sat 97% on room air.  CARDIAC: Patient has a normal rate and regular rhythm, no edema noted, capillary refill < 3 seconds.   GASTRO: Soft and non tender to palpation, no distention noted, Pt states bowel movements have been regular.  : Pt denies any pain or frequency with urination.  NEURO: Pt opens eyes spontaneously, behavior appropriate to situation, follows commands, facial expression symmetrical, bilateral hand grasp equal and even, purposeful motor response noted, normal sensation in all extremities when touched with a finger.

## 2024-05-13 NOTE — ED PROVIDER NOTES
Encounter Date: 5/13/2024       History     Chief Complaint   Patient presents with    Headache     Started few days ago, hx migraines     28-year-old female with past medical history of GERD, bipolar disorder presents emergency department for headache.  States she was history of migraines typically takes Elavil and Maxalt however she has not having any relief from her typical medications at home.  States her headache started 3 days ago which was gradual onset.  States it is frontal radiates to the occiput and describes it as pulsating.  Has mild photophobia.  Denies any phonophobia, neck stiffness, fevers/chills.    The history is provided by the patient.     Review of patient's allergies indicates:   Allergen Reactions    Venom-honey bee Other (See Comments)     Past Medical History:   Diagnosis Date    Allergy     Anxiety     Bipolar disorder     Depression     GERD (gastroesophageal reflux disease)      No past surgical history on file.  Family History   Problem Relation Name Age of Onset    Breast cancer Mother Nisha De La Paz         anemia, eczema    Arthritis Mother Nisha De La Paz     No Known Problems Father      ADD / ADHD Brother Sarthak Donaldson         asthma, depression    Asthma Brother Sarthak Donaldson     Diabetes Maternal Grandmother Vanessa Betancuort     No Known Problems Maternal Grandfather      Heart disease Paternal Grandmother Zohreh Ortiz     Cancer Paternal Grandmother Zohreh Ortiz     No Known Problems Paternal Grandfather      Alcohol abuse Maternal Aunt Marcelina Rodriguez     Alcohol abuse Maternal Uncle Gil Harkins     Alcohol abuse Paternal Uncle Gigi Alvarez     Alcohol abuse Paternal Aunt Lizy Castro      Social History     Tobacco Use    Smoking status: Never     Passive exposure: Never    Smokeless tobacco: Never   Substance Use Topics    Alcohol use: Not Currently    Drug use: Never     Review of Systems   Constitutional:  Negative for chills and fever.   HENT:  Negative for sore throat.    Eyes:   Positive for photophobia.   Respiratory:  Negative for cough and shortness of breath.    Cardiovascular:  Negative for chest pain.   Gastrointestinal:  Negative for abdominal pain.   Genitourinary:  Negative for difficulty urinating and dysuria.   Musculoskeletal: Negative.    Neurological:  Positive for headaches.   Psychiatric/Behavioral:  Negative for confusion.        Physical Exam     Initial Vitals [05/13/24 0734]   BP Pulse Resp Temp SpO2   (!) 146/98 100 18 98.6 °F (37 °C) 100 %      MAP       --         Physical Exam    Nursing note and vitals reviewed.  Constitutional: She appears well-developed and well-nourished.   HENT:   Head: Normocephalic and atraumatic.   Eyes: Conjunctivae and EOM are normal. Pupils are equal, round, and reactive to light.   Neck: Neck supple.   Normal range of motion.  Cardiovascular:  Regular rhythm, normal heart sounds and intact distal pulses.           Pulmonary/Chest: Breath sounds normal.   Abdominal: Abdomen is soft.   Musculoskeletal:         General: Normal range of motion.      Cervical back: Normal range of motion and neck supple.     Neurological: She is alert and oriented to person, place, and time. She has normal strength. No cranial nerve deficit. GCS score is 15. GCS eye subscore is 4. GCS verbal subscore is 5. GCS motor subscore is 6.   Skin: Skin is warm and dry. Capillary refill takes less than 2 seconds.         ED Course   Procedures  Labs Reviewed   POCT URINE PREGNANCY          Imaging Results    None          Medications   magnesium sulfate 2g in water 50mL IVPB (premix) (2 g Intravenous New Bag 5/13/24 0951)   lactated ringers bolus 1,000 mL (0 mLs Intravenous Stopped 5/13/24 0939)   ketorolac injection 9.999 mg (9.999 mg Intravenous Given 5/13/24 0822)   acetaminophen tablet 650 mg (650 mg Oral Given 5/13/24 0822)   metoclopramide injection 10 mg (10 mg Intravenous Given 5/13/24 0821)     Medical Decision Making  28-year-old female presents ED for  headache.  History of migraines.      Differential includes but not limited to migraine, CVA, meningitis, tension headache     Patient is neurovascularly intact.  History of migraines and no red flag symptoms or features.  She has no focal neurological deficits.  Do not feel head CT is warranted at this time.  She was treated with a migraine cocktail with resolution of her headache.  Discussed follow-up with her PCP or Neurology as needed.  Return ED precautions given.    Amount and/or Complexity of Data Reviewed  Labs: ordered.    Risk  OTC drugs.  Prescription drug management.                                      Clinical Impression:  Final diagnoses:  [R51.9] Acute nonintractable headache, unspecified headache type (Primary)          ED Disposition Condition    Discharge Stable          ED Prescriptions    None       Follow-up Information       Follow up With Specialties Details Why Contact Info    Jamaal Leyva MD Internal Medicine Schedule an appointment as soon as possible for a visit   [address] [place] [address]  [address] [place] [id number]  [phone]               Anay Freitas PA-C  05/13/24 1118

## 2024-05-15 ENCOUNTER — OFFICE VISIT (OUTPATIENT)
Dept: NEUROLOGY | Facility: CLINIC | Age: 29
End: 2024-05-15
Payer: COMMERCIAL

## 2024-05-15 VITALS
BODY MASS INDEX: 36.19 KG/M2 | SYSTOLIC BLOOD PRESSURE: 139 MMHG | HEIGHT: 64 IN | DIASTOLIC BLOOD PRESSURE: 96 MMHG | HEART RATE: 85 BPM | WEIGHT: 212 LBS

## 2024-05-15 DIAGNOSIS — G43.009 MIGRAINE WITHOUT AURA AND WITHOUT STATUS MIGRAINOSUS, NOT INTRACTABLE: ICD-10-CM

## 2024-05-15 DIAGNOSIS — G43.711 INTRACTABLE CHRONIC MIGRAINE WITHOUT AURA AND WITH STATUS MIGRAINOSUS: Primary | ICD-10-CM

## 2024-05-15 PROCEDURE — 1160F RVW MEDS BY RX/DR IN RCRD: CPT | Mod: CPTII,S$GLB,, | Performed by: PHYSICIAN ASSISTANT

## 2024-05-15 PROCEDURE — 99214 OFFICE O/P EST MOD 30 MIN: CPT | Mod: S$GLB,,, | Performed by: PHYSICIAN ASSISTANT

## 2024-05-15 PROCEDURE — 3080F DIAST BP >= 90 MM HG: CPT | Mod: CPTII,S$GLB,, | Performed by: PHYSICIAN ASSISTANT

## 2024-05-15 PROCEDURE — 3075F SYST BP GE 130 - 139MM HG: CPT | Mod: CPTII,S$GLB,, | Performed by: PHYSICIAN ASSISTANT

## 2024-05-15 PROCEDURE — 3008F BODY MASS INDEX DOCD: CPT | Mod: CPTII,S$GLB,, | Performed by: PHYSICIAN ASSISTANT

## 2024-05-15 PROCEDURE — 1159F MED LIST DOCD IN RCRD: CPT | Mod: CPTII,S$GLB,, | Performed by: PHYSICIAN ASSISTANT

## 2024-05-15 PROCEDURE — 99999 PR PBB SHADOW E&M-EST. PATIENT-LVL III: CPT | Mod: PBBFAC,,, | Performed by: PHYSICIAN ASSISTANT

## 2024-05-15 RX ORDER — AMITRIPTYLINE HYDROCHLORIDE 50 MG/1
50 TABLET, FILM COATED ORAL NIGHTLY
Qty: 30 TABLET | Refills: 2 | Status: SHIPPED | OUTPATIENT
Start: 2024-05-15 | End: 2024-06-11

## 2024-05-15 RX ORDER — METHYLPREDNISOLONE 4 MG/1
TABLET ORAL
Qty: 1 EACH | Refills: 0 | Status: SHIPPED | OUTPATIENT
Start: 2024-05-15

## 2024-05-15 RX ORDER — RIZATRIPTAN BENZOATE 10 MG/1
TABLET, ORALLY DISINTEGRATING ORAL
Qty: 18 TABLET | Refills: 5 | Status: SHIPPED | OUTPATIENT
Start: 2024-05-15 | End: 2024-06-11 | Stop reason: SDUPTHER

## 2024-05-15 NOTE — PROGRESS NOTES
Established Patient   SUBJECTIVE:  Patient ID: Moriah Lee   Chief Complaint: Follow-up    History of Present Illness:  Moriah Lee is a 28 y.o. female with migraines, anxiety, depression, GERD, bipolar d/o, h/o bells palsy 2015,   who presents to clinic alone for follow-up of headaches.       05/15/2024 - Interval History:  After last visit, pt was able to start elavil 25mg qhs and maxalt 5mg prn, and track ha's as advised. Per ha diary, ha's are occurring 13-20/30 days per month. Is unsure of triggers but does notice most ha's are typically occurring at work. Duration 1-88 hrs. Severity 3-9/10.   Maxlat 5mg prn - dulls HA's  Of note, pt recently went to the ED due to an intractable 3 day long HA which is still occurring, on day 5 currently. She was given  IV mag, IVF, toradol inj, tylenol, and reglan which somewhat helped. No neuro deficits on exam, or new symptoms.   Discussed lifestyle changes w/ pt and trigger mgmt.   Plan: medrol dose pack to break current cycle, increase elavil 50mg qhs, increase maxalt 10mg prn, rtc in 1 mo    Recommendations made at last Office Visit on 3/13/24:  - Discussed symptoms appear to be consistent with migraines, discussed treatment options and patient agreed with the following plan:  - ppx - start elavil 25mg qhs  - abortive - try maxalt 5mg prn, d/c fioricet and all other otcs to avoid rebound ha's  - nausea - continue zofran 4mg odt prn  - risks, benefits, and potential side effects of elavil, maxalt discussed   - alternative treatment options offered   - importance of healthy diet, regular exercise and sleep hygiene in the treatment of headaches    - Start tracking headaches via Migraine Cesar chuck on phone   - RTC in 3 mo     Treatments Tried:  Elavil   Maxalt   Fioricet   Naproxen (unsure of dosage) - helped in the past  Tylenol - helped in the past  Ibuprofen/advil otc - helped in the past    Current Medications:    Current Outpatient Medications:     fluticasone  "propionate (FLONASE) 50 mcg/actuation nasal spray, SPRAY 2 SPRAYS BY EACH NOSTRIL ROUTE ONCE DAILY., Disp: 48 mL, Rfl: 5    loratadine (CLARITIN) 10 mg tablet, TAKE 1 TABLET BY MOUTH EVERY DAY, Disp: 90 tablet, Rfl: 1    naproxen (NAPROSYN) 500 MG tablet, TAKE 1 TABLET BY MOUTH TWICE A DAY WITH MEALS, Disp: 60 tablet, Rfl: 1    norelgestromin-ethinyl estradiol (XULANE) 150-35 mcg/24 hr, Place 1 patch onto the skin once a week. Use continuously, skip period week, Disp: 12 patch, Rfl: 4    amitriptyline (ELAVIL) 50 MG tablet, Take 1 tablet (50 mg total) by mouth every evening., Disp: 30 tablet, Rfl: 2    methylPREDNISolone (MEDROL DOSEPACK) 4 mg tablet, use as directed, Disp: 1 each, Rfl: 0    omeprazole (PRILOSEC) 40 MG capsule, TAKE 1 CAPSULE (40 MG TOTAL) BY MOUTH BEFORE DINNER., Disp: 90 capsule, Rfl: 0    ondansetron (ZOFRAN-ODT) 4 MG TbDL, Take 1 tablet (4 mg total) by mouth every 8 (eight) hours as needed (nausea). (Patient not taking: Reported on 5/15/2024), Disp: 12 tablet, Rfl: 0    rizatriptan (MAXALT-MLT) 10 MG disintegrating tablet, Take at onset of migraine, can repeat in 2 hrs if needed.  No more than 2 tabs per day or 3 days/wk., Disp: 18 tablet, Rfl: 5    Review of Systems - as per HPI, otherwise a balanced 10 systems review is negative.    OBJECTIVE:  Vitals:  BP (!) 139/96 (BP Location: Right arm, Patient Position: Sitting, BP Method: Medium (Automatic))   Pulse 85   Ht 5' 4" (1.626 m)   Wt 96.2 kg (212 lb)   BMI 36.39 kg/m²      Physical Exam:  Constitutional: she appears well-developed and well-nourished. she is well groomed. NAD   HENT:    Head: Normocephalic and atraumatic  Eyes: Conjunctivae and EOM are normal  Musculoskeletal: Normal range of motion. No joint stiffness.   Skin: Skin is warm and dry.  Psychiatric: Mood and affect are normal    Neuro: Patient is alert and oriented to person, place, and time. Language is intact and fluent. Speech is clear and fluent. Recent and remote memory " are intact.  Normal attention and concentration.  Facial movement is symmetric. Moves all 4 extremities against gravity. Gait and station normal.  Cranial Nerves II through XII without focal deficit.     Review of Data:   Notes from neuro, ED reviewed   Labs:  Admission on 05/13/2024, Discharged on 05/13/2024   Component Date Value Ref Range Status    POC Preg Test, Ur 05/13/2024 Negative  Negative Final     Acceptable 05/13/2024 Yes   Final   Office Visit on 04/25/2024   Component Date Value Ref Range Status    Final Pathologic Diagnosis 04/25/2024    Final                    Value:1. Cervical biopsy at 6 o'clock:  Tissue insufficient for diagnosis.  2. Endocervical curettage shows fragments of benign endocervical tissue and a few detached fragments of squamous epithelium all admixed with mucus, no dysplasia identified.      Gross 04/25/2024    Final                    Value:1: Hospital/Clinic label MRN:  [id number]  Pathology label MRN:  [id number]    The specimen is received in formalin labeled &quot;6 o'clock&quot;. The specimen consists of a scant tan-white fragment of soft tissue measuring 0.1 x 0.1 x less than 0.1 cm. The specimen is filtered in a white tea bag and submitted entirely in cassette   [id number]. The specimen may not survive processing.    Darline Hernandezing Technologist    2: Hospital/Clinic label MRN:  [id number]  Pathology label MRN:  [id number]    The specimen is received in formalin labeled &quot;ECC&quot;. The specimen consists of possible scant tan-yellow fragments of soft tissue admixed with semi-translucent mucoid material measuring 0.5 x 0.4 x 0.2 cm in aggregate. The specimen is filtered in   a white tea bag and submitted entirely in cassette [id number].  The specimen may not survive processing.    Darline Hernandezing Technologist       Disclaimer 04/25/2024 Unless the case is a 'gross only' or additional testing only, the final diagnosis for each specimen is  based on a microscopic examination of appropriate tissue sections.   Final   Office Visit on 03/28/2024   Component Date Value Ref Range Status    Chlamydia, Amplified DNA 03/28/2024 Not Detected  Not Detected Final    N gonorrhoeae, amplified DNA 03/28/2024 Not Detected  Not Detected Final    Trichomonas vaginalis 03/28/2024 Negative  Negative Final    Candida sp 03/28/2024 Negative  Negative Final    Magaly glabrata DNA 03/28/2024 Negative  Negative Final    Magaly krusei DNA 03/28/2024 Negative  Negative Final    Bacterial vaginosis DNA 03/28/2024 Positive (A)  Negative Final    Cytology ThinPrep Pap Source 03/28/2024 Cervix   Final    Cytology ThinPrep Pap Report Status 03/28/2024 DNR   Final    Cytology Thinprep PAP Clinical His* 03/28/2024 Routine exam   Corrected    Cytology ThinPrep Pap LMP 03/28/2024 03/16/2024   Final    Cytology ThinPrep Previous PAP 03/28/2024 Unknown   Final    Cytology ThinPrep Previous Biopsy 03/28/2024 Unknown   Final    Cytology ThinPrep PAP Adequacy 03/28/2024 SEE BELOW   Final    Cytology ThinPrep PAP General Ninoska* 03/28/2024 SEE BELOW (A)   Final    Cytology ThinPrep PAP Interpretati* 03/28/2024 SEE BELOW (A)   Final    Cytology ThinPrep PAP Comment 03/28/2024 SEE BELOW   Final    Cytotechnologist 03/28/2024 SEE BELOW   Final    Review Cytotechnologist 03/28/2024 DNR   Final    Pathologist 03/28/2024 SEE BELOW   Final    Cytology ThinPrep PAP Infection 03/28/2024 DNR   Final    Cytology Thin Prep Pap Explanation 03/28/2024 SEE BELOW   Final     Imaging:  No results found for this or any previous visit.  Note: I have independently reviewed any/all imaging/labs/tests and agree with the report (s) as documented.  Any discrepancies will be as noted/demarcated by free text.  JOCELYN LEHMAN 5/15/2024    ASSESSMENT:  1. Intractable chronic migraine without aura and with status migrainosus    2. Migraine without aura and without status migrainosus, not intractable        PLAN:  - Discussed  symptoms appear to be consistent with migraines, discussed treatment options and patient agreed with the following plan:  - ppx - increase elavil 50mg qhs  - abortive - increase maxalt 10mg prn, d/c fioricet and all other otcs to avoid rebound ha's  - nausea - continue zofran 4mg odt prn  - medrol dose pack to break current intractable ha  - Continue tracking headaches   - Discussed goals of therapy are to decrease the frequency, intensity, and duration of headaches  - RTC in 1 mo     Orders Placed This Encounter    methylPREDNISolone (MEDROL DOSEPACK) 4 mg tablet    amitriptyline (ELAVIL) 50 MG tablet    rizatriptan (MAXALT-MLT) 10 MG disintegrating tablet       Discussed potential for teratogenicity with treatment, patient understands if her family planning status should change she will contact office immediately and we will safely adjust medications as needed.     Questions and concerns were sought and answered to the patient's stated verbal satisfaction.  The patient verbalizes understanding and agreement with the above stated treatment plan.     CC: Jamaal Leyva MD Sarena Patel, PA-C  [facility]   [phone]    Dr. Quijano was available during today's encounter.

## 2024-05-24 ENCOUNTER — NURSE TRIAGE (OUTPATIENT)
Dept: ADMINISTRATIVE | Facility: CLINIC | Age: 29
End: 2024-05-24
Payer: COMMERCIAL

## 2024-05-24 ENCOUNTER — PATIENT MESSAGE (OUTPATIENT)
Dept: NEUROLOGY | Facility: CLINIC | Age: 29
End: 2024-05-24
Payer: COMMERCIAL

## 2024-05-24 ENCOUNTER — HOSPITAL ENCOUNTER (EMERGENCY)
Facility: HOSPITAL | Age: 29
Discharge: HOME | End: 2024-05-24
Attending: STUDENT IN AN ORGANIZED HEALTH CARE EDUCATION/TRAINING PROGRAM
Payer: COMMERCIAL

## 2024-05-24 VITALS
RESPIRATION RATE: 18 BRPM | SYSTOLIC BLOOD PRESSURE: 124 MMHG | TEMPERATURE: 98 F | BODY MASS INDEX: 36.39 KG/M2 | OXYGEN SATURATION: 98 % | DIASTOLIC BLOOD PRESSURE: 72 MMHG | WEIGHT: 212 LBS | HEART RATE: 78 BPM

## 2024-05-24 DIAGNOSIS — R93.0 ABNORMAL CT OF THE HEAD: ICD-10-CM

## 2024-05-24 DIAGNOSIS — R51.9 CHRONIC NONINTRACTABLE HEADACHE, UNSPECIFIED HEADACHE TYPE: Primary | ICD-10-CM

## 2024-05-24 DIAGNOSIS — G89.29 CHRONIC NONINTRACTABLE HEADACHE, UNSPECIFIED HEADACHE TYPE: Primary | ICD-10-CM

## 2024-05-24 LAB
ANION GAP SERPL CALC-SCNC: 7 MMOL/L (ref 8–16)
B-HCG UR QL: NEGATIVE
BASOPHILS # BLD AUTO: 0.08 K/UL (ref 0–0.2)
BASOPHILS NFR BLD: 0.6 % (ref 0–1.9)
BUN SERPL-MCNC: 8 MG/DL (ref 6–20)
CALCIUM SERPL-MCNC: 9.2 MG/DL (ref 8.7–10.5)
CHLORIDE SERPL-SCNC: 108 MMOL/L (ref 95–110)
CO2 SERPL-SCNC: 19 MMOL/L (ref 23–29)
CREAT SERPL-MCNC: 0.8 MG/DL (ref 0.5–1.4)
CTP QC/QA: YES
DIFFERENTIAL METHOD BLD: ABNORMAL
EOSINOPHIL # BLD AUTO: 0.3 K/UL (ref 0–0.5)
EOSINOPHIL NFR BLD: 2 % (ref 0–8)
ERYTHROCYTE [DISTWIDTH] IN BLOOD BY AUTOMATED COUNT: 13.4 % (ref 11.5–14.5)
EST. GFR  (NO RACE VARIABLE): >60 ML/MIN/1.73 M^2
GLUCOSE SERPL-MCNC: 101 MG/DL (ref 70–110)
HCT VFR BLD AUTO: 38.1 % (ref 37–48.5)
HGB BLD-MCNC: 12.3 G/DL (ref 12–16)
IMM GRANULOCYTES # BLD AUTO: 0.05 K/UL (ref 0–0.04)
IMM GRANULOCYTES NFR BLD AUTO: 0.4 % (ref 0–0.5)
LYMPHOCYTES # BLD AUTO: 3 K/UL (ref 1–4.8)
LYMPHOCYTES NFR BLD: 21.5 % (ref 18–48)
MCH RBC QN AUTO: 28.2 PG (ref 27–31)
MCHC RBC AUTO-ENTMCNC: 32.3 G/DL (ref 32–36)
MCV RBC AUTO: 87 FL (ref 82–98)
MONOCYTES # BLD AUTO: 1.4 K/UL (ref 0.3–1)
MONOCYTES NFR BLD: 10.2 % (ref 4–15)
NEUTROPHILS # BLD AUTO: 9.2 K/UL (ref 1.8–7.7)
NEUTROPHILS NFR BLD: 65.3 % (ref 38–73)
NRBC BLD-RTO: 0 /100 WBC
PLATELET # BLD AUTO: 282 K/UL (ref 150–450)
PMV BLD AUTO: 10.9 FL (ref 9.2–12.9)
POTASSIUM SERPL-SCNC: 4.9 MMOL/L (ref 3.5–5.1)
RBC # BLD AUTO: 4.36 M/UL (ref 4–5.4)
SODIUM SERPL-SCNC: 134 MMOL/L (ref 136–145)
WBC # BLD AUTO: 14.08 K/UL (ref 3.9–12.7)

## 2024-05-24 PROCEDURE — 81025 URINE PREGNANCY TEST: CPT | Performed by: PHYSICIAN ASSISTANT

## 2024-05-24 PROCEDURE — 25000003 PHARM REV CODE 250: Performed by: PHYSICIAN ASSISTANT

## 2024-05-24 PROCEDURE — 63600175 PHARM REV CODE 636 W HCPCS: Performed by: PHYSICIAN ASSISTANT

## 2024-05-24 PROCEDURE — 96361 HYDRATE IV INFUSION ADD-ON: CPT

## 2024-05-24 PROCEDURE — 96375 TX/PRO/DX INJ NEW DRUG ADDON: CPT

## 2024-05-24 PROCEDURE — 96374 THER/PROPH/DIAG INJ IV PUSH: CPT

## 2024-05-24 PROCEDURE — 85025 COMPLETE CBC W/AUTO DIFF WBC: CPT | Performed by: PHYSICIAN ASSISTANT

## 2024-05-24 PROCEDURE — 80048 BASIC METABOLIC PNL TOTAL CA: CPT | Performed by: PHYSICIAN ASSISTANT

## 2024-05-24 PROCEDURE — 99285 EMERGENCY DEPT VISIT HI MDM: CPT | Mod: 25

## 2024-05-24 RX ORDER — DIPHENHYDRAMINE HYDROCHLORIDE 50 MG/ML
25 INJECTION INTRAMUSCULAR; INTRAVENOUS
Status: COMPLETED | OUTPATIENT
Start: 2024-05-24 | End: 2024-05-24

## 2024-05-24 RX ORDER — ACETAMINOPHEN 500 MG
1000 TABLET ORAL
Status: COMPLETED | OUTPATIENT
Start: 2024-05-24 | End: 2024-05-24

## 2024-05-24 RX ORDER — PROCHLORPERAZINE EDISYLATE 5 MG/ML
10 INJECTION INTRAMUSCULAR; INTRAVENOUS
Status: COMPLETED | OUTPATIENT
Start: 2024-05-24 | End: 2024-05-24

## 2024-05-24 RX ADMIN — ACETAMINOPHEN 1000 MG: 500 TABLET ORAL at 01:05

## 2024-05-24 RX ADMIN — DIPHENHYDRAMINE HYDROCHLORIDE 25 MG: 50 INJECTION, SOLUTION INTRAMUSCULAR; INTRAVENOUS at 01:05

## 2024-05-24 RX ADMIN — SODIUM CHLORIDE 1000 ML: 9 INJECTION, SOLUTION INTRAVENOUS at 01:05

## 2024-05-24 RX ADMIN — PROCHLORPERAZINE EDISYLATE 10 MG: 5 INJECTION INTRAMUSCULAR; INTRAVENOUS at 01:05

## 2024-05-24 NOTE — DISCHARGE INSTRUCTIONS
Your CT head does not show any acute processes. You have Empty sella configuration with notable prominence of Meckel's caves bilaterally, which is nonspecific. Follow up with neurology to finish the work up.   Continue medication as prescribed by neurology.     Take ibuprofen 600-800 mg every 6 hours as needed with food for anti-inflammatory relief.  You can take acetaminophen/tylenol 650 mg every 6 hours or 1000 mg every 8 hours for added relief.  Rest. Do  method as discussed.   Drink 2-3 liters of water daily.   Follow up with Neurology and PCP.  Return to the ER for new or worsening symptoms.  Future Appointments   Date Time Provider Department Center   6/11/2024  8:00 AM Ignacia Mckeon PA-C [facility] NEURO [facility]     Imaging Results              CT Head Without Contrast (Final result)  Result time 05/24/24 14:06:06      Final result by Russ Lopez MD (05/24/24 14:06:06)                   Impression:      No evidence of acute intracranial hemorrhage.    Empty sella configuration with notable prominence of Meckel's caves bilaterally.  These are nonspecific findings, but have been associated with idiopathic intracranial hypertension (pseudotumor cerebri).    Clinical correlation advised.      Electronically signed by: Russ Lopez MD  Date:    05/24/2024  Time:    14:06               Narrative:    EXAMINATION:  CT HEAD WITHOUT CONTRAST    CLINICAL HISTORY:  Headache, chronic, new features or increased frequency;    TECHNIQUE:  Low dose axial CT images obtained throughout the head without the use of intravenous contrast.  Axial, sagittal and coronal reconstructions were performed.    COMPARISON:  None.    FINDINGS:  Intracranial compartment:    Ventricles and sulci are normal in size for age without evidence of hydrocephalus.    The brain parenchyma appears within normal limits.  No parenchymal  hemorrhage, edema, mass effect or major vascular distribution infarct.    No extra-axial blood or fluid  collections.    Empty sella configuration.  Prominent Meckel's caves.    Skull/extracranial contents (limited evaluation):    No displaced calvarial fracture.    The mastoid air cells and visualized paranasal sinuses are essentially clear.

## 2024-05-24 NOTE — ED PROVIDER NOTES
Encounter Date: 5/24/2024       History     Chief Complaint   Patient presents with    Headache     Temporal and posterior area, hx of migraines     12:45 PM  Patient is a 28-year-old female with a history of headaches, Bell's palsy in 2015 with deficits, who presents to [facility] ED with persistent intractable headache.    She states that she has been having headache since the age of 8.  Saw neurology in March and was prescribed amitriptyline/Elavil and rizatriptan/Malxalt.  Patient went to the ED for a headache on 5/13 and then followed up with Neurology again.  They increased her medication doses and prescribed her a Medrol Dosepak.  She states that she did not get any relief after medication adjustment and steroids at home.  She took rizatriptan on Sunday, Monday, and Tuesday.  She only took Elavil today.  She is endorsing 10/10 pain to her posterior head and bilateral temporal regions with subjective blurred vision without eye pain, vision loss, or diplopia.  She denies a change in her headache.  She denies any fever, chills, nausea, vomiting, neck pain or stiffness, head trauma, tinnitus.  She drove herself to the emergency department without any difficulty.        Review of patient's allergies indicates:   Allergen Reactions    Venom-honey bee Other (See Comments)     Past Medical History:   Diagnosis Date    Allergy     Anxiety     Bipolar disorder     Depression     GERD (gastroesophageal reflux disease)      History reviewed. No pertinent surgical history.  Family History   Problem Relation Name Age of Onset    Breast cancer Mother Nisha De La Paz         anemia, eczema    Arthritis Mother Nisha De La Paz     No Known Problems Father      ADD / ADHD Brother Sarthak Donaldson         asthma, depression    Asthma Brother Sarthak Donaldson     Diabetes Maternal Grandmother Vanessa Kennedyada     No Known Problems Maternal Grandfather      Heart disease Paternal Grandmother Zohreh Ortiz     Cancer Paternal Grandmother Zohreh Ortiz      No Known Problems Paternal Grandfather      Alcohol abuse Maternal Aunt Marcelina Rodriguez     Alcohol abuse Maternal Uncle Gil Harkins     Alcohol abuse Paternal Uncle Gigi Alvarez     Alcohol abuse Paternal Aunt Lziy Castro      Social History     Tobacco Use    Smoking status: Never     Passive exposure: Never    Smokeless tobacco: Never   Substance Use Topics    Alcohol use: Not Currently    Drug use: Never     Review of Systems   Constitutional:  Negative for activity change, appetite change, chills, diaphoresis and fever.   HENT:  Negative for sore throat.    Eyes:  Negative for photophobia, pain, discharge and itching.   Respiratory:  Negative for shortness of breath.    Cardiovascular:  Negative for chest pain.   Gastrointestinal:  Negative for abdominal pain, constipation, diarrhea, nausea and vomiting.   Genitourinary:  Negative for dysuria.   Musculoskeletal:  Negative for back pain, myalgias, neck pain and neck stiffness.   Skin:  Negative for rash.   Neurological:  Positive for headaches. Negative for dizziness, weakness and numbness.   Hematological:  Does not bruise/bleed easily.       Physical Exam     Initial Vitals [05/24/24 1027]   BP Pulse Resp Temp SpO2   (!) 152/82 106 18 98.7 °F (37.1 °C) 100 %      MAP       --         Physical Exam    Vitals reviewed.  Constitutional: She appears well-developed and well-nourished. She is not diaphoretic. She is cooperative.  Non-toxic appearance. She does not have a sickly appearance. She does not appear ill. No distress.   Occasional left eye twitching and ptosis secondary to old Bell's palsy.   HENT:   Head: Normocephalic and atraumatic. Head is without raccoon's eyes. Hair is normal.   Nose: Nose normal.   Mouth/Throat: No trismus in the jaw.   Area to pain without erythema, wounds, abrasions, edema, induration, rashes.   Eyes: Conjunctivae and EOM are normal. Pupils are equal, round, and reactive to light. Right conjunctiva is not injected. Right  conjunctiva has no hemorrhage. Left conjunctiva is not injected. Left conjunctiva has no hemorrhage. No scleral icterus. Right eye exhibits no nystagmus. Left eye exhibits no nystagmus.   Neck:   Normal range of motion.  Pulmonary/Chest: No accessory muscle usage. No tachypnea. No respiratory distress.   Abdominal: She exhibits no distension.   Musculoskeletal:         General: Normal range of motion.      Cervical back: Normal range of motion. No rigidity. No spinous process tenderness or muscular tenderness. Normal range of motion.     Neurological: She is alert. She has normal strength.   You can see her old bell palsy deficits when she smiles or laughs.  No facial asymmetry at rest.  Clear speech.  Provides full history. Normal finger-to-nose, rapid alternating hand and feet movements.   Ambulatory without gait abnormalities.    Skin: Skin is warm and dry. No erythema. No pallor.         ED Course   Procedures  Labs Reviewed   CBC W/ AUTO DIFFERENTIAL - Abnormal; Notable for the following components:       Result Value    WBC 14.08 (*)     Gran # (ANC) 9.2 (*)     Immature Grans (Abs) 0.05 (*)     Mono # 1.4 (*)     All other components within normal limits   BASIC METABOLIC PANEL - Abnormal; Notable for the following components:    Sodium 134 (*)     CO2 19 (*)     Anion Gap 7 (*)     All other components within normal limits   POCT URINE PREGNANCY          Imaging Results              CT Head Without Contrast (Final result)  Result time 05/24/24 14:06:06      Final result by Russ Lopez MD (05/24/24 14:06:06)                   Impression:      No evidence of acute intracranial hemorrhage.    Empty sella configuration with notable prominence of Meckel's caves bilaterally.  These are nonspecific findings, but have been associated with idiopathic intracranial hypertension (pseudotumor cerebri).    Clinical correlation advised.      Electronically signed by: Russ Lopez  MD  Date:    05/24/2024  Time:    14:06               Narrative:    EXAMINATION:  CT HEAD WITHOUT CONTRAST    CLINICAL HISTORY:  Headache, chronic, new features or increased frequency;    TECHNIQUE:  Low dose axial CT images obtained throughout the head without the use of intravenous contrast.  Axial, sagittal and coronal reconstructions were performed.    COMPARISON:  None.    FINDINGS:  Intracranial compartment:    Ventricles and sulci are normal in size for age without evidence of hydrocephalus.    The brain parenchyma appears within normal limits.  No parenchymal  hemorrhage, edema, mass effect or major vascular distribution infarct.    No extra-axial blood or fluid collections.    Empty sella configuration.  Prominent Meckel's caves.    Skull/extracranial contents (limited evaluation):    No displaced calvarial fracture.    The mastoid air cells and visualized paranasal sinuses are essentially clear.                                       Medications   prochlorperazine injection Soln 10 mg (10 mg Intravenous Given 5/24/24 1337)   diphenhydrAMINE injection 25 mg (25 mg Intravenous Given 5/24/24 1335)   acetaminophen tablet 1,000 mg (1,000 mg Oral Given 5/24/24 1335)   sodium chloride 0.9% bolus 1,000 mL 1,000 mL (0 mLs Intravenous Stopped 5/24/24 1438)     Medical Decision Making  Patient is a 28-year-old female with a history of headaches, Bell's palsy in 2015 with deficits, who presents to [facility] ED with persistent intractable headache.    Differential diagnosis includes but is not limited to migraine headache, tension headache, cluster headache, cervicogenic headache, DJD, strain, sprain, occipital neuralgia, other neuralgia, intracerebral abnormality such as mass.    Patient has had headache since the age of 8.  This is her 2nd ED visit.  She has never had a brain imaging test.  Will obtain CT head, basic labs, give medication for symptomatic relief, and reassess.    Amount and/or Complexity of Data  Reviewed  External Data Reviewed: labs and notes.  Labs: ordered. Decision-making details documented in ED Course.  Radiology: ordered.    Risk  OTC drugs.  Prescription drug management.               ED Course as of 05/24/24 1704   Fri May 24, 2024   1342 WBC(!): 14.08  She just finished medrol dose pack course on 5/15/24. She does not have any acute infectious symptoms. I don't think she has an infection causing her leukocytosis including meningitis/encephalitis.  [CL]   1343 hCG Qualitative, Urine: Negative [CL]   1343 Sodium(!): 134 [CL]   1343 Potassium: 4.9 [CL]   1343 CO2(!): 19 [CL]   1343 Glucose: 101 [CL]   1343 Creatinine: 0.8 [CL]   1424 Patient is sleeping comfortably.  [CL]   1519 Patient still sleeping.  [CL]   1630 Patient reassessed.  She reports significant improvement in her pain while here.  It is a 4/10.  She feels comfortable being discharged.  Her CT scan shows no acute processes. She has Empty sella configuration with notable prominence of Meckel's caves bilaterally, but is nonspecific.  Since her pain significantly improved and does not have any other symptoms for IIH, I feel that she is appropriate for outpatient follow-up with Neurology. She is agreeable to this plan.  I recommend continue medication as prescribed on Neurology.  Ibuprofen and/or Acetaminophen as needed for pain relief.  Rest.  Stay hydrated.  We discussed miliary method to relax. All of her questions were answered. Patient comfortable with plan and stable for discharge.  [CL]      ED Course User Index  [CL] Jo Ann Thakur PA-C                     I have reviewed patient's chart and discussed this case with my supervising MD.     Jo Ann Thakur PA-C  Emergent Department  [facility]  Spectralink #[id number] or #[id number]        Clinical Impression:  Final diagnoses:  [R51.9, G89.29] Chronic nonintractable headache, unspecified headache type (Primary)  [R93.0] Abnormal CT of the head          ED Disposition Condition     Discharge Stable          ED Prescriptions    None       Follow-up Information       Follow up With Specialties Details Why Contact Info    Ignacia Mckeon PA-C Neurology Schedule an appointment as soon as possible for a visit   [address]  [facility] [id number]  [phone]      [facility] - Emergency Dept Emergency Medicine  If symptoms worsen [address] [facility] [id number] [phone]          Future Appointments   Date Time Provider Department Center   6/11/2024  8:00 AM Ignacia Mckeon PA-C [facility] NEURO Jo Ann Mcmillan PA-C  05/24/24 [id number]

## 2024-05-24 NOTE — TELEPHONE ENCOUNTER
Pt calling with c/o headache since mother's day. SHe went to the ED and was given a migraine cocktail and it got the pain down to a 3 but the HA came back and  now saying pain is a 10 and triaged and care advice to the ED or PCP with approval. Pt called for Migraine pain clinic and Brielle not there today and advised the ED. Will send to NP to assist to see if can make and appt or uc. Pt has a hx of migraines as well.  NP GERONIMO Trujillo said to the ED for 10 pain scale. Pt advised as such               Reason for Disposition   SEVERE headache, states 'worst headache' of life    Additional Information   Negative: Difficult to awaken or acting confused (e.g., disoriented, slurred speech)   Negative: Weakness of the face, arm or leg on one side of the body and new-onset   Negative: Numbness of the face, arm or leg on one side of the body and new-onset   Negative: Loss of speech or garbled speech and new-onset   Negative: Passed out (i.e., fainted, collapsed and was not responding)   Negative: Sounds like a life-threatening emergency to the triager   Negative: Unable to walk without falling   Negative: Stiff neck (can't touch chin to chest)   Negative: Possibility of carbon monoxide exposure    Protocols used: Headache-A-OH

## 2024-05-24 NOTE — ED NOTES
Patient identifiers verified and correct for  MS Lee  C/C:  Headache SEE NN  APPEARANCE: awake and alert in NAD. PAIN  120/10  SKIN: warm, dry and intact. No breakdown or bruising.  MUSCULOSKELETAL: Patient moving all extremities spontaneously, no obvious swelling or deformities noted. Ambulates independently.  RESPIRATORY: Denies shortness of breath.Respirations unlabored.   CARDIAC: Denies CP, 2+ distal pulses; no peripheral edema  ABDOMEN: S/ND/NT, Denies nausea  : voids spontaneously, denies difficulty  Neurologic: AAO x 4; follows commands equal strength in all extremities; denies numbness/tingling. Denies dizziness Denies new weakness, states pain to back head

## 2024-05-24 NOTE — ED NOTES
Patient states pain in back pf head Mothers Day, was seen in ED for same,  also seen per PMD, headache clinic and PMD again states it is not a headache, it is a pain,

## 2024-05-24 NOTE — Clinical Note
"Moriah "Asya Lee was seen and treated in our emergency department on 5/24/2024.  She may return to work on 05/27/2024.       If you have any questions or concerns, please don't hesitate to call.      Jo Ann Thakur PA-C"

## 2024-06-06 NOTE — PROGRESS NOTES
"Established Patient   SUBJECTIVE:  Patient ID: Moriah Lee   Chief Complaint: Headache and Follow-up    History of Present Illness:  Moriah Lee is a 28 y.o. female with migraines, anxiety, depression, GERD, bipolar d/o, h/o bells palsy 2015,   who presents to clinic alone for follow-up of headaches.       06/11/2024 - Interval History:  Since last visit 1 mo ago, ha's have worsened. Medrol dose pack did not help. Did not increase elavil dose as discussed last visit. Did try maxalt 10mg which resolves HA's temporarily. Ha's are now occurring constantly/daily. She denies otc overuse and only takes tylenol 1-2 times a week. She was seen in ED on 5/24 where CTH revealed "Empty sella configuration with notable prominence of Meckel's caves bilaterally.  These are nonspecific findings, but have been associated with idiopathic intracranial hypertension (pseudotumor cerebri). Clinical correlation advised." Holguin improved w/ IVF, tylenol, diphenhydramine, and compazine and pt was discharged. Pt describes holguin's as both a "headache and a head pain" the headache is described as frontalis throbbing/pulsating pain that she's able to "still do stuff through". The head pain is described as a poudning or tingling pain in her occipitalis region with associated photophobia aggravated by lack of sleep preferring dark rooms. In the last 2 weeks, she has been experiencing new symptoms w/ this HA which includes blurs of color in vision or double vision. She denies all other ROS including tinnitus or positional components to ha's. No recent changes.   Plan: start tpx titrating to 50mg/d by next visit, continue elavil 25mg qhs and maxalt 10mg prn, obtain Mri brain and see ophtho to r/o IIH, consider NB, rtc after imaging    05/15/2024 - Interval History:  After last visit, pt was able to start elavil 25mg qhs and maxalt 5mg prn, and track ha's as advised. Per ha diary, ha's are occurring 13-20/30 days per month. Is unsure of " triggers but does notice most ha's are typically occurring at work. Duration 1-88 hrs. Severity 3-9/10.   Maxlat 5mg prn - dulls HA's  Of note, pt recently went to the ED due to an intractable 3 day long HA which is still occurring, on day 5 currently. She was given  IV mag, IVF, toradol inj, tylenol, and reglan which somewhat helped. No neuro deficits on exam, or new symptoms.   Discussed lifestyle changes w/ pt and trigger mgmt.   Plan: medrol dose pack to break current cycle, increase elavil 50mg qhs, increase maxalt 10mg prn, rtc in 1 mo    Recommendations made at last Office Visit on 3/13/24:  - Discussed symptoms appear to be consistent with migraines, discussed treatment options and patient agreed with the following plan:  - ppx - start elavil 25mg qhs  - abortive - try maxalt 5mg prn, d/c fioricet and all other otcs to avoid rebound ha's  - nausea - continue zofran 4mg odt prn  - risks, benefits, and potential side effects of elavil, maxalt discussed   - alternative treatment options offered   - importance of healthy diet, regular exercise and sleep hygiene in the treatment of headaches    - Start tracking headaches via Migraine Cesar chuck on phone   - RTC in 3 mo     Treatments Tried:  Elavil 25mg  Maxalt 10mg - resolves ha's  Fioricet   Naproxen (unsure of dosage) - helped in the past  Tylenol - helped in the past  Ibuprofen/advil otc - helped in the past  Medrol dose pack - didn't help    Current Medications:    Current Outpatient Medications:     fluticasone propionate (FLONASE) 50 mcg/actuation nasal spray, SPRAY 2 SPRAYS BY EACH NOSTRIL ROUTE ONCE DAILY., Disp: 48 mL, Rfl: 5    loratadine (CLARITIN) 10 mg tablet, TAKE 1 TABLET BY MOUTH EVERY DAY, Disp: 90 tablet, Rfl: 1    methylPREDNISolone (MEDROL DOSEPACK) 4 mg tablet, use as directed, Disp: 1 each, Rfl: 0    naproxen (NAPROSYN) 500 MG tablet, TAKE 1 TABLET BY MOUTH TWICE A DAY WITH MEALS, Disp: 60 tablet, Rfl: 1    norelgestromin-ethinyl estradiol  "(XULANE) 150-35 mcg/24 hr, Place 1 patch onto the skin once a week. Use continuously, skip period week, Disp: 12 patch, Rfl: 4    ondansetron (ZOFRAN-ODT) 4 MG TbDL, Take 1 tablet (4 mg total) by mouth every 8 (eight) hours as needed (nausea)., Disp: 12 tablet, Rfl: 0    rizatriptan (MAXALT-MLT) 10 MG disintegrating tablet, Take at onset of migraine, can repeat in 2 hrs if needed.  No more than 2 tabs per day or 3 days/wk., Disp: 18 tablet, Rfl: 5    amitriptyline (ELAVIL) 25 MG tablet, Take 1 tablet (25 mg total) by mouth every evening., Disp: 30 tablet, Rfl: 2    omeprazole (PRILOSEC) 40 MG capsule, TAKE 1 CAPSULE (40 MG TOTAL) BY MOUTH BEFORE DINNER., Disp: 90 capsule, Rfl: 0    topiramate (TOPAMAX) 25 MG tablet, Take 1 tablet (25 mg total) by mouth once daily for 14 days, THEN 2 tablets (50 mg total) once daily for 14 days. Take one tablet daily for 2 weeks, then two tablets daily afterwards., Disp: 42 tablet, Rfl: 0    Review of Systems - as per HPI, otherwise a balanced 10 systems review is negative.    OBJECTIVE:  Vitals:  /84   Pulse 86   Ht 5' 4" (1.626 m)   Wt 96.2 kg (212 lb 1.3 oz)   LMP 05/24/2024   BMI 36.40 kg/m²      Physical Exam:  Constitutional: she appears well-developed and well-nourished. she is well groomed. NAD   HENT:    Head: Normocephalic and atraumatic  Eyes: Conjunctivae and EOM are normal  Musculoskeletal: Normal range of motion. No joint stiffness.   Skin: Skin is warm and dry.  Psychiatric: Mood and affect are normal    Neuro: Patient is alert and oriented to person, place, and time. Language is intact and fluent. Speech is clear and fluent. Recent and remote memory are intact.  Normal attention and concentration.  Facial movement is symmetric. Moves all 4 extremities against gravity. Gait and station normal.  Cranial Nerves II through XII without focal deficit.     Review of Data:   Notes from neuro, ED reviewed   Labs:  Admission on 05/24/2024, Discharged on 05/24/2024 "   Component Date Value Ref Range Status    WBC 05/24/2024 14.08 (H)  3.90 - 12.70 K/uL Final    RBC 05/24/2024 4.36  4.00 - 5.40 M/uL Final    Hemoglobin 05/24/2024 12.3  12.0 - 16.0 g/dL Final    Hematocrit 05/24/2024 38.1  37.0 - 48.5 % Final    MCV 05/24/2024 87  82 - 98 fL Final    MCH 05/24/2024 28.2  27.0 - 31.0 pg Final    MCHC 05/24/2024 32.3  32.0 - 36.0 g/dL Final    RDW 05/24/2024 13.4  11.5 - 14.5 % Final    Platelets 05/24/2024 282  150 - 450 K/uL Final    MPV 05/24/2024 10.9  9.2 - 12.9 fL Final    Immature Granulocytes 05/24/2024 0.4  0.0 - 0.5 % Final    Gran # (ANC) 05/24/2024 9.2 (H)  1.8 - 7.7 K/uL Final    Immature Grans (Abs) 05/24/2024 0.05 (H)  0.00 - 0.04 K/uL Final    Lymph # 05/24/2024 3.0  1.0 - 4.8 K/uL Final    Mono # 05/24/2024 1.4 (H)  0.3 - 1.0 K/uL Final    Eos # 05/24/2024 0.3  0.0 - 0.5 K/uL Final    Baso # 05/24/2024 0.08  0.00 - 0.20 K/uL Final    nRBC 05/24/2024 0  0 /100 WBC Final    Gran % 05/24/2024 65.3  38.0 - 73.0 % Final    Lymph % 05/24/2024 21.5  18.0 - 48.0 % Final    Mono % 05/24/2024 10.2  4.0 - 15.0 % Final    Eosinophil % 05/24/2024 2.0  0.0 - 8.0 % Final    Basophil % 05/24/2024 0.6  0.0 - 1.9 % Final    Differential Method 05/24/2024 Automated   Final    Sodium 05/24/2024 134 (L)  136 - 145 mmol/L Final    Potassium 05/24/2024 4.9  3.5 - 5.1 mmol/L Final    Chloride 05/24/2024 108  95 - 110 mmol/L Final    CO2 05/24/2024 19 (L)  23 - 29 mmol/L Final    Glucose 05/24/2024 101  70 - 110 mg/dL Final    BUN 05/24/2024 8  6 - 20 mg/dL Final    Creatinine 05/24/2024 0.8  0.5 - 1.4 mg/dL Final    Calcium 05/24/2024 9.2  8.7 - 10.5 mg/dL Final    Anion Gap 05/24/2024 7 (L)  8 - 16 mmol/L Final    eGFR 05/24/2024 >60.0  >60 mL/min/1.73 m^2 Final    POC Preg Test, Ur 05/24/2024 Negative  Negative Final     Acceptable 05/24/2024 Yes   Final   Admission on 05/13/2024, Discharged on 05/13/2024   Component Date Value Ref Range Status    POC Preg Test, Ur  05/13/2024 Negative  Negative Final     Acceptable 05/13/2024 Yes   Final   Office Visit on 04/25/2024   Component Date Value Ref Range Status    Final Pathologic Diagnosis 04/25/2024    Final                    Value:1. Cervical biopsy at 6 o'clock:  Tissue insufficient for diagnosis.  2. Endocervical curettage shows fragments of benign endocervical tissue and a few detached fragments of squamous epithelium all admixed with mucus, no dysplasia identified.      Gross 04/25/2024    Final                    Value:1: Hospital/Clinic label MRN:  [id number]  Pathology label MRN:  [id number]    The specimen is received in formalin labeled &quot;6 o'clock&quot;. The specimen consists of a scant tan-white fragment of soft tissue measuring 0.1 x 0.1 x less than 0.1 cm. The specimen is filtered in a white tea bag and submitted entirely in cassette   [id number]. The specimen may not survive processing.    Darline Walters  Grossing Technologist    2: Hospital/Clinic label MRN:  [id number]  Pathology label MRN:  [id number]    The specimen is received in formalin labeled &quot;ECC&quot;. The specimen consists of possible scant tan-yellow fragments of soft tissue admixed with semi-translucent mucoid material measuring 0.5 x 0.4 x 0.2 cm in aggregate. The specimen is filtered in   a white tea bag and submitted entirely in cassette [id number].  The specimen may not survive processing.    Darline Walters  Grossing Technologist       Disclaimer 04/25/2024 Unless the case is a 'gross only' or additional testing only, the final diagnosis for each specimen is based on a microscopic examination of appropriate tissue sections.   Final   Office Visit on 03/28/2024   Component Date Value Ref Range Status    Chlamydia, Amplified DNA 03/28/2024 Not Detected  Not Detected Final    N gonorrhoeae, amplified DNA 03/28/2024 Not Detected  Not Detected Final    Trichomonas vaginalis 03/28/2024 Negative  Negative Final    Candida sp  03/28/2024 Negative  Negative Final    Magaly glabrata DNA 03/28/2024 Negative  Negative Final    Magaly krusei DNA 03/28/2024 Negative  Negative Final    Bacterial vaginosis DNA 03/28/2024 Positive (A)  Negative Final    Cytology ThinPrep Pap Source 03/28/2024 Cervix   Final    Cytology ThinPrep Pap Report Status 03/28/2024 DNR   Final    Cytology Thinprep PAP Clinical His* 03/28/2024 Routine exam   Corrected    Cytology ThinPrep Pap LMP 03/28/2024 03/16/2024   Final    Cytology ThinPrep Previous PAP 03/28/2024 Unknown   Final    Cytology ThinPrep Previous Biopsy 03/28/2024 Unknown   Final    Cytology ThinPrep PAP Adequacy 03/28/2024 SEE BELOW   Final    Cytology ThinPrep PAP General Ninoska* 03/28/2024 SEE BELOW (A)   Final    Cytology ThinPrep PAP Interpretati* 03/28/2024 SEE BELOW (A)   Final    Cytology ThinPrep PAP Comment 03/28/2024 SEE BELOW   Final    Cytotechnologist 03/28/2024 SEE BELOW   Final    Review Cytotechnologist 03/28/2024 DNR   Final    Pathologist 03/28/2024 SEE BELOW   Final    Cytology ThinPrep PAP Infection 03/28/2024 DNR   Final    Cytology Thin Prep Pap Explanation 03/28/2024 SEE BELOW   Final     Imaging:  No results found for this or any previous visit.  Note: I have independently reviewed any/all imaging/labs/tests and agree with the report (s) as documented.  Any discrepancies will be as noted/demarcated by free text.  JOCELYN LEHMAN 6/11/2024    ASSESSMENT:  1. Intractable headache, unspecified chronicity pattern, unspecified headache type    2. Intractable chronic migraine without aura and with status migrainosus    3. Migraine without aura and without status migrainosus, not intractable        PLAN:  - Discussed symptoms appear to be consistent with migraines, discussed treatment options and patient agreed with the following plan:  - ppx -  start tpx titrating to 50mg/d by next visit, continue elavil 25mg qhs  - abortive - continue maxalt 10mg prn, d/c fioricet and all other otcs to avoid  rebound ha's  - nausea - continue zofran 4mg odt prn  - consider NB  - obtain Mri brain and see ophtho to r/o IIH  - Continue tracking headaches   - Discussed goals of therapy are to decrease the frequency, intensity, and duration of headaches  - RTC after imaging     Orders Placed This Encounter    MRI Brain W WO Contrast    Ambulatory referral/consult to Ophthalmology    topiramate (TOPAMAX) 25 MG tablet    amitriptyline (ELAVIL) 25 MG tablet         Discussed potential for teratogenicity with treatment, patient understands if her family planning status should change she will contact office immediately and we will safely adjust medications as needed.     Questions and concerns were sought and answered to the patient's stated verbal satisfaction.  The patient verbalizes understanding and agreement with the above stated treatment plan.     CC: Jamaal Leyva MD Sarena Patel, PA-C  [facility]   [phone]    Dr. Quijano was available during today's encounter.     I spent 42 minutes on the day of this encounter preparing for, treating and managing this patient presenting with headaches.

## 2024-06-11 ENCOUNTER — OFFICE VISIT (OUTPATIENT)
Dept: NEUROLOGY | Facility: CLINIC | Age: 29
End: 2024-06-11
Payer: COMMERCIAL

## 2024-06-11 VITALS
WEIGHT: 212.06 LBS | DIASTOLIC BLOOD PRESSURE: 84 MMHG | HEART RATE: 86 BPM | SYSTOLIC BLOOD PRESSURE: 134 MMHG | BODY MASS INDEX: 36.2 KG/M2 | HEIGHT: 64 IN

## 2024-06-11 DIAGNOSIS — G43.009 MIGRAINE WITHOUT AURA AND WITHOUT STATUS MIGRAINOSUS, NOT INTRACTABLE: ICD-10-CM

## 2024-06-11 DIAGNOSIS — R51.9 INTRACTABLE HEADACHE, UNSPECIFIED CHRONICITY PATTERN, UNSPECIFIED HEADACHE TYPE: Primary | ICD-10-CM

## 2024-06-11 DIAGNOSIS — G43.711 INTRACTABLE CHRONIC MIGRAINE WITHOUT AURA AND WITH STATUS MIGRAINOSUS: ICD-10-CM

## 2024-06-11 PROCEDURE — 3008F BODY MASS INDEX DOCD: CPT | Mod: CPTII,S$GLB,, | Performed by: PHYSICIAN ASSISTANT

## 2024-06-11 PROCEDURE — 3075F SYST BP GE 130 - 139MM HG: CPT | Mod: CPTII,S$GLB,, | Performed by: PHYSICIAN ASSISTANT

## 2024-06-11 PROCEDURE — 99999 PR PBB SHADOW E&M-EST. PATIENT-LVL IV: CPT | Mod: PBBFAC,,, | Performed by: PHYSICIAN ASSISTANT

## 2024-06-11 PROCEDURE — 1160F RVW MEDS BY RX/DR IN RCRD: CPT | Mod: CPTII,S$GLB,, | Performed by: PHYSICIAN ASSISTANT

## 2024-06-11 PROCEDURE — 3079F DIAST BP 80-89 MM HG: CPT | Mod: CPTII,S$GLB,, | Performed by: PHYSICIAN ASSISTANT

## 2024-06-11 PROCEDURE — 1159F MED LIST DOCD IN RCRD: CPT | Mod: CPTII,S$GLB,, | Performed by: PHYSICIAN ASSISTANT

## 2024-06-11 PROCEDURE — 99215 OFFICE O/P EST HI 40 MIN: CPT | Mod: S$GLB,,, | Performed by: PHYSICIAN ASSISTANT

## 2024-06-11 RX ORDER — RIZATRIPTAN BENZOATE 10 MG/1
TABLET, ORALLY DISINTEGRATING ORAL
Qty: 18 TABLET | Refills: 5 | Status: SHIPPED | OUTPATIENT
Start: 2024-06-11

## 2024-06-11 RX ORDER — TOPIRAMATE 25 MG/1
TABLET ORAL
Qty: 42 TABLET | Refills: 0 | Status: SHIPPED | OUTPATIENT
Start: 2024-06-11 | End: 2024-07-09

## 2024-06-11 RX ORDER — AMITRIPTYLINE HYDROCHLORIDE 25 MG/1
25 TABLET, FILM COATED ORAL NIGHTLY
Qty: 30 TABLET | Refills: 2 | Status: SHIPPED | OUTPATIENT
Start: 2024-06-11 | End: 2024-09-09

## 2024-07-01 DIAGNOSIS — R51.9 INTRACTABLE HEADACHE, UNSPECIFIED CHRONICITY PATTERN, UNSPECIFIED HEADACHE TYPE: ICD-10-CM

## 2024-07-01 RX ORDER — TOPIRAMATE 50 MG/1
50 TABLET, FILM COATED ORAL DAILY
Qty: 90 TABLET | Refills: 1 | Status: SHIPPED | OUTPATIENT
Start: 2024-07-01 | End: 2024-12-28

## 2024-07-29 ENCOUNTER — ON-DEMAND VIRTUAL (OUTPATIENT)
Dept: URGENT CARE | Facility: CLINIC | Age: 29
End: 2024-07-29
Payer: COMMERCIAL

## 2024-07-29 DIAGNOSIS — N39.0 URINARY TRACT INFECTION WITHOUT HEMATURIA, SITE UNSPECIFIED: Primary | ICD-10-CM

## 2024-07-29 PROCEDURE — 99213 OFFICE O/P EST LOW 20 MIN: CPT | Mod: CC,95,,

## 2024-07-29 RX ORDER — NITROFURANTOIN 25; 75 MG/1; MG/1
100 CAPSULE ORAL 2 TIMES DAILY
Qty: 14 CAPSULE | Refills: 0 | Status: SHIPPED | OUTPATIENT
Start: 2024-07-29 | End: 2024-08-05

## 2024-07-29 NOTE — PROGRESS NOTES
Subjective:      Patient ID: Moriah Lee is a 28 y.o. female rafael peraza    Vitals:  vitals were not taken for this visit.     Chief Complaint: Urinary Tract Infection      Visit Type: TELE AUDIOVISUAL    Present with the patient at the time of consultation: TELEMED PRESENT WITH PATIENT: None    Past Medical History:   Diagnosis Date    Allergy     Anxiety     Bipolar disorder     Depression     GERD (gastroesophageal reflux disease)      History reviewed. No pertinent surgical history.  Review of patient's allergies indicates:   Allergen Reactions    Venom-honey bee Other (See Comments)     Current Outpatient Medications on File Prior to Visit   Medication Sig Dispense Refill    amitriptyline (ELAVIL) 25 MG tablet Take 1 tablet (25 mg total) by mouth every evening. 30 tablet 2    fluticasone propionate (FLONASE) 50 mcg/actuation nasal spray SPRAY 2 SPRAYS BY EACH NOSTRIL ROUTE ONCE DAILY. 48 mL 5    loratadine (CLARITIN) 10 mg tablet TAKE 1 TABLET BY MOUTH EVERY DAY 90 tablet 1    methylPREDNISolone (MEDROL DOSEPACK) 4 mg tablet use as directed 1 each 0    naproxen (NAPROSYN) 500 MG tablet TAKE 1 TABLET BY MOUTH TWICE A DAY WITH MEALS 60 tablet 1    norelgestromin-ethinyl estradiol (XULANE) 150-35 mcg/24 hr Place 1 patch onto the skin once a week. Use continuously, skip period week 12 patch 4    omeprazole (PRILOSEC) 40 MG capsule TAKE 1 CAPSULE (40 MG TOTAL) BY MOUTH BEFORE DINNER. 90 capsule 0    ondansetron (ZOFRAN-ODT) 4 MG TbDL Take 1 tablet (4 mg total) by mouth every 8 (eight) hours as needed (nausea). 12 tablet 0    rizatriptan (MAXALT-MLT) 10 MG disintegrating tablet Take at onset of migraine, can repeat in 2 hrs if needed.  No more than 2 tabs per day or 3 days/wk. 18 tablet 5    topiramate (TOPAMAX) 50 MG tablet Take 1 tablet (50 mg total) by mouth once daily. 90 tablet 1     No current facility-administered medications on file prior to visit.     Family History   Problem Relation Name Age of Onset     Breast cancer Mother Nisha De La Paz         anemia, eczema    Arthritis Mother Nisha De La Paz     No Known Problems Father      ADD / ADHD Brother Sarthak Donaldson         asthma, depression    Asthma Brother Sarthak Donaldson     Diabetes Maternal Grandmother Vanessa Betnacourt     No Known Problems Maternal Grandfather      Heart disease Paternal Grandmother Zohreh Ortiz     Cancer Paternal Grandmother Zohreh Ortiz     No Known Problems Paternal Grandfather      Alcohol abuse Maternal Aunt Marcelina Rodriguez     Alcohol abuse Maternal Uncle Gil Harkins     Alcohol abuse Paternal Uncle Gigi Alvarez     Alcohol abuse Paternal Aunt Lizy Castro        Medications Ordered                [id number] [place] - [address]   [address], [place] [address]    Telephone: [phone]   Fax: [phone]   Hours: Not open 24 hours                         E-Prescribed (1 of 1)              nitrofurantoin, macrocrystal-monohydrate, (MACROBID) 100 MG capsule    Sig: Take 1 capsule (100 mg total) by mouth 2 (two) times daily. for 7 days       Start: 7/29/24     Quantity: 14 capsule Refills: 0                           Ohs Peq Odvv Intake    7/29/2024  6:36 AM CDT - Filed by Patient   What is your current physical address in the event of a medical emergency? [address] , [place] [id number]   Are you able to take your vital signs? No   Please attach any relevant images or files          Patient states that for the past several days she has been having urinary urgency, pain and frequency. Pt denies any fever, blood in urine, abdominal pain or flank pain. Pt denies any chance of an STD. Pt states that this is her first UTI.         Genitourinary:  Positive for dysuria, frequency and urgency.        Objective:   The physical exam was conducted virtually.  Physical Exam   Constitutional: She is oriented to person, place, and time.   HENT:   Head: Normocephalic and atraumatic.   Nose: Nose normal.   Eyes:  Conjunctivae are normal. Pupils are equal, round, and reactive to light. Extraocular movement intact   Neck: Neck supple.   Pulmonary/Chest: Effort normal.   Abdominal: Normal appearance.   Musculoskeletal: Normal range of motion.         General: Normal range of motion.   Neurological: no focal deficit. She is alert, oriented to person, place, and time and at baseline.   Skin: Skin is warm.   Psychiatric: Her behavior is normal. Mood, judgment and thought content normal.       Assessment:     1. Urinary tract infection without hematuria, site unspecified        Plan:       Urinary tract infection without hematuria, site unspecified  -     nitrofurantoin, macrocrystal-monohydrate, (MACROBID) 100 MG capsule; Take 1 capsule (100 mg total) by mouth 2 (two) times daily. for 7 days  Dispense: 14 capsule; Refill: 0

## 2024-09-03 ENCOUNTER — PATIENT MESSAGE (OUTPATIENT)
Dept: NEUROLOGY | Facility: CLINIC | Age: 29
End: 2024-09-03
Payer: COMMERCIAL

## 2024-09-23 ENCOUNTER — OFFICE VISIT (OUTPATIENT)
Dept: OPTOMETRY | Facility: CLINIC | Age: 29
End: 2024-09-23
Payer: COMMERCIAL

## 2024-09-23 DIAGNOSIS — H52.203 ASTIGMATISM OF BOTH EYES, UNSPECIFIED TYPE: Primary | ICD-10-CM

## 2024-09-23 DIAGNOSIS — H53.9 VISUAL DISTURBANCE: ICD-10-CM

## 2024-09-23 DIAGNOSIS — G43.009 MIGRAINE WITHOUT AURA AND WITHOUT STATUS MIGRAINOSUS, NOT INTRACTABLE: ICD-10-CM

## 2024-09-23 DIAGNOSIS — R51.9 INTRACTABLE HEADACHE, UNSPECIFIED CHRONICITY PATTERN, UNSPECIFIED HEADACHE TYPE: ICD-10-CM

## 2024-09-23 PROCEDURE — 92004 COMPRE OPH EXAM NEW PT 1/>: CPT | Mod: S$GLB,,, | Performed by: OPTOMETRIST

## 2024-09-23 PROCEDURE — 99999 PR PBB SHADOW E&M-EST. PATIENT-LVL III: CPT | Mod: PBBFAC,,, | Performed by: OPTOMETRIST

## 2024-09-23 PROCEDURE — 92250 FUNDUS PHOTOGRAPHY W/I&R: CPT | Mod: S$GLB,,, | Performed by: OPTOMETRIST

## 2024-09-23 PROCEDURE — 92015 DETERMINE REFRACTIVE STATE: CPT | Mod: S$GLB,,, | Performed by: OPTOMETRIST

## 2024-09-23 NOTE — PROGRESS NOTES
HPI    CRISTY: unknown     CC: Pt is here today for a routine eye exam. Pt states that in May she was   experiencing severe migraines whish was causing blurry vision. Pt was   referred to be checked for high IOP. Pt was diagnosed with bells palsy in   2015    (-)Dryness  (-)Burning  (-)Itchiness  (+)Tearing  (-)Flashes  (-)Floaters   (+)Photophobia  (+)Eye Pain      Diabetic: no    Contact Lens: no    Eye Meds: none    Ocular History: none    Family History:   Glaucoma on maternal and paternal sides    PD: 61.0    Last edited by Cathie Lopez MA on 9/23/2024 10:03 AM.            Assessment /Plan     For exam results, see Encounter Report.    Astigmatism of both eyes, unspecified type   Blurred vision OD: BCVA 20/40+       Intractable headache, unspecified chronicity pattern, unspecified headache type  Migraine without aura and without status migrainosus, not intractable      Visual disturbance   Pt reports constant visual disturbance   Disc photos today   Elevation of disc OD>OS    Return for HVF/OCT  Consult Dr. Jane

## 2024-09-25 ENCOUNTER — TELEPHONE (OUTPATIENT)
Dept: OPHTHALMOLOGY | Facility: CLINIC | Age: 29
End: 2024-09-25
Payer: COMMERCIAL

## 2024-09-25 NOTE — TELEPHONE ENCOUNTER
----- Message from Fang Jane MD sent at 9/25/2024  1:39 PM CDT -----  12/2024  RC  ----- Message -----  From: Manuel Hill  Sent: 9/25/2024   1:20 PM CDT  To: Fang Jane MD    When to schedule?  ----- Message -----  From: Guerda Decker OD  Sent: 9/25/2024   1:02 PM CDT  To: [facility] Staff    Please schedule pt with Dr. Jane for papilledema eval. She has HVF scheduled on Monday.

## 2024-09-30 ENCOUNTER — CLINICAL SUPPORT (OUTPATIENT)
Dept: OPHTHALMOLOGY | Facility: CLINIC | Age: 29
End: 2024-09-30
Payer: COMMERCIAL

## 2024-09-30 DIAGNOSIS — R51.9 INTRACTABLE HEADACHE, UNSPECIFIED CHRONICITY PATTERN, UNSPECIFIED HEADACHE TYPE: ICD-10-CM

## 2024-09-30 NOTE — PROGRESS NOTES
OCT/HVF done ou./ rel/fix poor od good os coop. Good ou./ chart checked for latex allergy./ -3.25 + 2.50 x 100/od -2.00 + 1.75 x 78/os-[facility]

## 2024-10-10 ENCOUNTER — PATIENT MESSAGE (OUTPATIENT)
Dept: OBSTETRICS AND GYNECOLOGY | Facility: CLINIC | Age: 29
End: 2024-10-10
Payer: COMMERCIAL

## 2024-10-11 ENCOUNTER — OFFICE VISIT (OUTPATIENT)
Dept: OBSTETRICS AND GYNECOLOGY | Facility: CLINIC | Age: 29
End: 2024-10-11
Payer: COMMERCIAL

## 2024-10-11 VITALS
SYSTOLIC BLOOD PRESSURE: 108 MMHG | WEIGHT: 217.81 LBS | DIASTOLIC BLOOD PRESSURE: 77 MMHG | HEIGHT: 64 IN | BODY MASS INDEX: 37.19 KG/M2

## 2024-10-11 DIAGNOSIS — Z30.09 ENCOUNTER FOR COUNSELING REGARDING CONTRACEPTION: Primary | ICD-10-CM

## 2024-10-11 PROCEDURE — 99999 PR PBB SHADOW E&M-EST. PATIENT-LVL III: CPT | Mod: PBBFAC,,, | Performed by: STUDENT IN AN ORGANIZED HEALTH CARE EDUCATION/TRAINING PROGRAM

## 2024-10-11 RX ORDER — ETONOGESTREL AND ETHINYL ESTRADIOL VAGINAL RING .015; .12 MG/D; MG/D
1 RING VAGINAL
Qty: 4 EACH | Refills: 3 | Status: SHIPPED | OUTPATIENT
Start: 2024-10-11 | End: 2025-10-11

## 2024-10-11 RX ORDER — ETONOGESTREL AND ETHINYL ESTRADIOL VAGINAL RING .015; .12 MG/D; MG/D
1 RING VAGINAL
Qty: 4 EACH | Refills: 3 | Status: SHIPPED | OUTPATIENT
Start: 2024-10-11 | End: 2024-10-11

## 2024-10-11 NOTE — PROGRESS NOTES
CC: Follow-up    HPI:  Moriah Lee is a 28 y.o. female  presents with complaint of skin changes with patch, wants to change to vaginal ring. Patches were working well for a little, but she started having rashes on her skin. Changed the location multiple times with no improvement. Migraines are worse right now that usual, she was on topamax and elavil but stopped the topamax. Has follow up with neuro     ROS:  GENERAL: No fever, chills, fatigability or weight loss.  VULVAR: No pain, no lesions and no itching.  VAGINAL: No relaxation, no itching, no discharge, no abnormal bleeding and no lesions.  ABDOMEN: No abdominal pain. Denies nausea. Denies vomiting. No diarrhea. No constipation  BREAST: Denies pain. No lumps. No discharge.  URINARY: No incontinence, no nocturia, no frequency and no dysuria.  CARDIOVASCULAR: No chest pain. No shortness of breath. No leg cramps.  NEUROLOGICAL: No vision changes.      Patient History:  Past Medical History:   Diagnosis Date    Allergy     Anxiety     Bipolar disorder     Depression     GERD (gastroesophageal reflux disease)      History reviewed. No pertinent surgical history.  Social History     Tobacco Use    Smoking status: Never     Passive exposure: Never    Smokeless tobacco: Never   Substance Use Topics    Alcohol use: Not Currently    Drug use: Never     Family History   Problem Relation Name Age of Onset    Breast cancer Mother Nisha De La Paz         anemia, eczema    Arthritis Mother Nisha De La Paz     No Known Problems Father      ADD / ADHD Brother Sarthak Donaldson         asthma, depression    Asthma Brother Sarthak Mendoza     Diabetes Maternal Grandmother Vanessa Pablodioada     No Known Problems Maternal Grandfather      Heart disease Paternal Grandmother Zohreh Angel     Cancer Paternal Grandmother Zohreh Angel     No Known Problems Paternal Grandfather      Alcohol abuse Maternal Aunt Marcelina Michael     Alcohol abuse Maternal Uncle Gil Harkins     Alcohol abuse  "Paternal Uncle Gigi Alvarez     Alcohol abuse Paternal Aunt Lizy Castro      OB History    Para Term  AB Living   0 0 0 0 0 0   SAB IAB Ectopic Multiple Live Births   0 0 0 0 0       Objective:   /77   Ht 5' 4" (1.626 m)   Wt 98.8 kg (217 lb 13 oz)   LMP 10/03/2024 (Exact Date)   BMI 37.39 kg/m²   Patient's last menstrual period was 10/03/2024 (exact date).      PHYSICAL EXAM:  APPEARANCE: Well nourished, well developed, in no acute distress.  AFFECT: WNL, alert and oriented x 3  SKIN: No acne or hirsutism  NECK: Neck symmetric without masses or thyromegaly  CHEST: Good respiratory effect  EXTREMITIES: No edema.      ASSESSMENT and PLAN:    ICD-10-CM ICD-9-CM    1. Encounter for counseling regarding contraception  Z30.09 V25.09 etonogestreL-ethinyl estradioL (NUVARING) 0.12-0.015 mg/24 hr vaginal ring      DISCONTINUED: etonogestreL-ethinyl estradioL (NUVARING) 0.12-0.015 mg/24 hr vaginal ring          Contraceptive change  - discussed nuvaring may help better with migraine since hormones are local not systemic, reviewed how to place and use as regular or continuous if desired  - reviewed potential side effects, all questions answered   - rx sent to pharmacy, will notify me if any questions or issues       Follow up: as needed or 1 year TIERA Bedolla MD  [facility]       " [id number] Detailed

## 2024-12-03 ENCOUNTER — PATIENT MESSAGE (OUTPATIENT)
Dept: ADMINISTRATIVE | Facility: OTHER | Age: 29
End: 2024-12-03
Payer: COMMERCIAL

## 2024-12-04 ENCOUNTER — PATIENT MESSAGE (OUTPATIENT)
Dept: OPHTHALMOLOGY | Facility: CLINIC | Age: 29
End: 2024-12-04
Payer: COMMERCIAL

## 2025-01-10 ENCOUNTER — OFFICE VISIT (OUTPATIENT)
Dept: PRIMARY CARE CLINIC | Facility: CLINIC | Age: 30
End: 2025-01-10
Payer: COMMERCIAL

## 2025-01-10 ENCOUNTER — LAB VISIT (OUTPATIENT)
Dept: LAB | Facility: HOSPITAL | Age: 30
End: 2025-01-10
Attending: FAMILY MEDICINE
Payer: COMMERCIAL

## 2025-01-10 VITALS
RESPIRATION RATE: 16 BRPM | DIASTOLIC BLOOD PRESSURE: 70 MMHG | OXYGEN SATURATION: 98 % | SYSTOLIC BLOOD PRESSURE: 110 MMHG | HEART RATE: 72 BPM | WEIGHT: 218.94 LBS | HEIGHT: 64 IN | BODY MASS INDEX: 37.38 KG/M2

## 2025-01-10 DIAGNOSIS — Z00.00 ANNUAL PHYSICAL EXAM: Primary | ICD-10-CM

## 2025-01-10 DIAGNOSIS — G44.229 CHRONIC TENSION-TYPE HEADACHE, NOT INTRACTABLE: ICD-10-CM

## 2025-01-10 DIAGNOSIS — E61.1 IRON DEFICIENCY: ICD-10-CM

## 2025-01-10 DIAGNOSIS — Z13.1 SCREENING FOR DIABETES MELLITUS: ICD-10-CM

## 2025-01-10 DIAGNOSIS — E55.9 VITAMIN D DEFICIENCY: ICD-10-CM

## 2025-01-10 DIAGNOSIS — F43.21 GRIEF: ICD-10-CM

## 2025-01-10 DIAGNOSIS — Z00.00 ANNUAL PHYSICAL EXAM: ICD-10-CM

## 2025-01-10 DIAGNOSIS — E53.8 VITAMIN B12 DEFICIENCY: ICD-10-CM

## 2025-01-10 DIAGNOSIS — Z13.6 ENCOUNTER FOR LIPID SCREENING FOR CARDIOVASCULAR DISEASE: ICD-10-CM

## 2025-01-10 DIAGNOSIS — Z13.220 ENCOUNTER FOR LIPID SCREENING FOR CARDIOVASCULAR DISEASE: ICD-10-CM

## 2025-01-10 LAB
25(OH)D3+25(OH)D2 SERPL-MCNC: 7 NG/ML (ref 30–96)
ALBUMIN SERPL BCP-MCNC: 3.9 G/DL (ref 3.5–5.2)
ALP SERPL-CCNC: 66 U/L (ref 40–150)
ALT SERPL W/O P-5'-P-CCNC: 14 U/L (ref 10–44)
ANION GAP SERPL CALC-SCNC: 10 MMOL/L (ref 8–16)
AST SERPL-CCNC: 17 U/L (ref 10–40)
BASOPHILS # BLD AUTO: 0.04 K/UL (ref 0–0.2)
BASOPHILS NFR BLD: 0.4 % (ref 0–1.9)
BILIRUB SERPL-MCNC: 0.3 MG/DL (ref 0.1–1)
BUN SERPL-MCNC: 10 MG/DL (ref 6–20)
CALCIUM SERPL-MCNC: 9.4 MG/DL (ref 8.7–10.5)
CHLORIDE SERPL-SCNC: 109 MMOL/L (ref 95–110)
CHOLEST SERPL-MCNC: 177 MG/DL (ref 120–199)
CHOLEST/HDLC SERPL: 5.5 {RATIO} (ref 2–5)
CO2 SERPL-SCNC: 24 MMOL/L (ref 23–29)
CREAT SERPL-MCNC: 0.8 MG/DL (ref 0.5–1.4)
DIFFERENTIAL METHOD BLD: ABNORMAL
EOSINOPHIL # BLD AUTO: 0.3 K/UL (ref 0–0.5)
EOSINOPHIL NFR BLD: 2.8 % (ref 0–8)
ERYTHROCYTE [DISTWIDTH] IN BLOOD BY AUTOMATED COUNT: 13.8 % (ref 11.5–14.5)
EST. GFR  (NO RACE VARIABLE): >60 ML/MIN/1.73 M^2
ESTIMATED AVG GLUCOSE: 108 MG/DL (ref 68–131)
FERRITIN SERPL-MCNC: 40 NG/ML (ref 20–300)
GLUCOSE SERPL-MCNC: 92 MG/DL (ref 70–110)
HBA1C MFR BLD: 5.4 % (ref 4–5.6)
HCT VFR BLD AUTO: 41 % (ref 37–48.5)
HDLC SERPL-MCNC: 32 MG/DL (ref 40–75)
HDLC SERPL: 18.1 % (ref 20–50)
HGB BLD-MCNC: 12.8 G/DL (ref 12–16)
IMM GRANULOCYTES # BLD AUTO: 0.03 K/UL (ref 0–0.04)
IMM GRANULOCYTES NFR BLD AUTO: 0.3 % (ref 0–0.5)
IRON SERPL-MCNC: 57 UG/DL (ref 30–160)
LDLC SERPL CALC-MCNC: 133.6 MG/DL (ref 63–159)
LYMPHOCYTES # BLD AUTO: 2.8 K/UL (ref 1–4.8)
LYMPHOCYTES NFR BLD: 31.5 % (ref 18–48)
MCH RBC QN AUTO: 27.4 PG (ref 27–31)
MCHC RBC AUTO-ENTMCNC: 31.2 G/DL (ref 32–36)
MCV RBC AUTO: 88 FL (ref 82–98)
MONOCYTES # BLD AUTO: 0.8 K/UL (ref 0.3–1)
MONOCYTES NFR BLD: 8.6 % (ref 4–15)
NEUTROPHILS # BLD AUTO: 5.1 K/UL (ref 1.8–7.7)
NEUTROPHILS NFR BLD: 56.4 % (ref 38–73)
NONHDLC SERPL-MCNC: 145 MG/DL
NRBC BLD-RTO: 0 /100 WBC
PLATELET # BLD AUTO: 265 K/UL (ref 150–450)
PMV BLD AUTO: 13.3 FL (ref 9.2–12.9)
POTASSIUM SERPL-SCNC: 4.4 MMOL/L (ref 3.5–5.1)
PROT SERPL-MCNC: 7.9 G/DL (ref 6–8.4)
RBC # BLD AUTO: 4.67 M/UL (ref 4–5.4)
SATURATED IRON: 13 % (ref 20–50)
SODIUM SERPL-SCNC: 143 MMOL/L (ref 136–145)
T4 FREE SERPL-MCNC: 1.01 NG/DL (ref 0.71–1.51)
TOTAL IRON BINDING CAPACITY: 437 UG/DL (ref 250–450)
TRANSFERRIN SERPL-MCNC: 295 MG/DL (ref 200–375)
TRIGL SERPL-MCNC: 57 MG/DL (ref 30–150)
TSH SERPL DL<=0.005 MIU/L-ACNC: 1.51 UIU/ML (ref 0.4–4)
VIT B12 SERPL-MCNC: 223 PG/ML (ref 210–950)
WBC # BLD AUTO: 8.96 K/UL (ref 3.9–12.7)

## 2025-01-10 PROCEDURE — 84439 ASSAY OF FREE THYROXINE: CPT | Performed by: NURSE PRACTITIONER

## 2025-01-10 PROCEDURE — 3008F BODY MASS INDEX DOCD: CPT | Mod: CPTII,S$GLB,, | Performed by: NURSE PRACTITIONER

## 2025-01-10 PROCEDURE — 84466 ASSAY OF TRANSFERRIN: CPT | Performed by: NURSE PRACTITIONER

## 2025-01-10 PROCEDURE — 3078F DIAST BP <80 MM HG: CPT | Mod: CPTII,S$GLB,, | Performed by: NURSE PRACTITIONER

## 2025-01-10 PROCEDURE — 82607 VITAMIN B-12: CPT | Performed by: NURSE PRACTITIONER

## 2025-01-10 PROCEDURE — 85025 COMPLETE CBC W/AUTO DIFF WBC: CPT | Performed by: NURSE PRACTITIONER

## 2025-01-10 PROCEDURE — 82306 VITAMIN D 25 HYDROXY: CPT | Performed by: NURSE PRACTITIONER

## 2025-01-10 PROCEDURE — 1159F MED LIST DOCD IN RCRD: CPT | Mod: CPTII,S$GLB,, | Performed by: NURSE PRACTITIONER

## 2025-01-10 PROCEDURE — 3044F HG A1C LEVEL LT 7.0%: CPT | Mod: CPTII,S$GLB,, | Performed by: NURSE PRACTITIONER

## 2025-01-10 PROCEDURE — 99395 PREV VISIT EST AGE 18-39: CPT | Mod: S$GLB,,, | Performed by: NURSE PRACTITIONER

## 2025-01-10 PROCEDURE — 80053 COMPREHEN METABOLIC PANEL: CPT | Performed by: NURSE PRACTITIONER

## 2025-01-10 PROCEDURE — 83036 HEMOGLOBIN GLYCOSYLATED A1C: CPT | Performed by: NURSE PRACTITIONER

## 2025-01-10 PROCEDURE — 99999 PR PBB SHADOW E&M-EST. PATIENT-LVL IV: CPT | Mod: PBBFAC,,, | Performed by: NURSE PRACTITIONER

## 2025-01-10 PROCEDURE — 3074F SYST BP LT 130 MM HG: CPT | Mod: CPTII,S$GLB,, | Performed by: NURSE PRACTITIONER

## 2025-01-10 PROCEDURE — 84443 ASSAY THYROID STIM HORMONE: CPT | Performed by: NURSE PRACTITIONER

## 2025-01-10 PROCEDURE — 80061 LIPID PANEL: CPT | Performed by: NURSE PRACTITIONER

## 2025-01-10 PROCEDURE — 82728 ASSAY OF FERRITIN: CPT | Performed by: NURSE PRACTITIONER

## 2025-01-10 PROCEDURE — 1160F RVW MEDS BY RX/DR IN RCRD: CPT | Mod: CPTII,S$GLB,, | Performed by: NURSE PRACTITIONER

## 2025-01-10 NOTE — PROGRESS NOTES
[facility] Note    Chief Complaint      Chief Complaint   Patient presents with    Annual Exam     History of Present Illness      Moriah Lee is a 29 y.o. female patient of Dr. Leyva with chronic conditions of grief, migraines who presents today for annual.      Labs- ordered  Eye exams- utd, glasses  Gyn- [facility]    Vaccines:  Gave Tdap order    History of Present Illness    CHIEF COMPLAINT:  Moriah presents today for an annual follow-up and lab work.    CURRENT SYMPTOMS:  She reports fatigue. She experiences itchy ears and sniffles, likely due to allergies.    DEPRESSION:  She has depression previously attributed to low vitamin D levels. She prefers therapy over medication for management. She is experiencing grief due to the loss of her uncle in July.    MEDICAL HISTORY:  She has a history of stomach ulcer and low B12 levels.    ALLERGIES:  She has allergies to honey bee venom causing swelling and hives.    PAST MEDICATIONS:  She has previously taken anxiety medication for migraine management.      ROS:  General: +fatigue  Skin: +itching  Psychiatric: +depression  Allergic: +rhinitis, +hives         Answers submitted by the patient for this visit:  Review of Systems Questionnaire (Submitted on 1/3/2025)  activity change: Yes  unexpected weight change: No  neck pain: No  hearing loss: No  rhinorrhea: No  trouble swallowing: No  eye discharge: No  visual disturbance: No  chest tightness: No  wheezing: No  chest pain: No  palpitations: No  blood in stool: No  constipation: No  vomiting: No  diarrhea: Yes  polydipsia: No  polyuria: No  difficulty urinating: No  hematuria: No  menstrual problem: No  dysuria: No  joint swelling: No  arthralgias: No  headaches: No  weakness: No  confusion: No  dysphoric mood: Yes    Health Maintenance   Topic Date Due    TETANUS VACCINE  Never done    COVID-19 Vaccine (2 - 2024-25 season) 09/01/2024    Pap Smear  03/28/2027    RSV Vaccine (Age 60+ and Pregnant  patients) (1 - 1-dose 75+ series) 11/20/2071    Hepatitis C Screening  Completed    Influenza Vaccine  Completed    HIV Screening  Completed    Lipid Panel  Completed    Pneumococcal Vaccines (Age 0-49)  Aged Out       Past Medical History:   Diagnosis Date    Allergy     Anxiety     Bipolar disorder     Depression     GERD (gastroesophageal reflux disease)        No past surgical history on file.    family history includes ADD / ADHD in her brother; Alcohol abuse in her maternal aunt, maternal uncle, paternal aunt, and paternal uncle; Arthritis in her mother; Asthma in her brother; Breast cancer in her mother; Cancer in her paternal grandmother; Diabetes in her maternal grandmother; Heart disease in her paternal grandmother; No Known Problems in her father, maternal grandfather, and paternal grandfather.     Social History     Tobacco Use    Smoking status: Never     Passive exposure: Never    Smokeless tobacco: Never   Substance Use Topics    Alcohol use: Not Currently    Drug use: Never       Outpatient Encounter Medications as of 1/10/2025   Medication Sig Dispense Refill    etonogestreL-ethinyl estradioL (NUVARING) 0.12-0.015 mg/24 hr vaginal ring Place 1 each vaginally every 21 days. Insert one (1) ring vaginally and leave in place for three (3) weeks, then remove for one (1) week. 4 each 3    fluticasone propionate (FLONASE) 50 mcg/actuation nasal spray SPRAY 2 SPRAYS BY EACH NOSTRIL ROUTE ONCE DAILY. (Patient not taking: Reported on 1/10/2025) 48 mL 5    rizatriptan (MAXALT-MLT) 10 MG disintegrating tablet Take at onset of migraine, can repeat in 2 hrs if needed.  No more than 2 tabs per day or 3 days/wk. (Patient not taking: Reported on 1/10/2025) 18 tablet 5    topiramate (TOPAMAX) 50 MG tablet Take 1 tablet (50 mg total) by mouth once daily. (Patient not taking: Reported on 10/11/2024) 90 tablet 1    [DISCONTINUED] amitriptyline (ELAVIL) 25 MG tablet Take 1 tablet (25 mg total) by mouth every evening. 30  "tablet 2    [DISCONTINUED] loratadine (CLARITIN) 10 mg tablet TAKE 1 TABLET BY MOUTH EVERY DAY 90 tablet 1    [DISCONTINUED] methylPREDNISolone (MEDROL DOSEPACK) 4 mg tablet use as directed 1 each 0    [DISCONTINUED] naproxen (NAPROSYN) 500 MG tablet TAKE 1 TABLET BY MOUTH TWICE A DAY WITH MEALS 60 tablet 1    [DISCONTINUED] omeprazole (PRILOSEC) 40 MG capsule TAKE 1 CAPSULE (40 MG TOTAL) BY MOUTH BEFORE DINNER. 90 capsule 0    [DISCONTINUED] ondansetron (ZOFRAN-ODT) 4 MG TbDL Take 1 tablet (4 mg total) by mouth every 8 (eight) hours as needed (nausea). 12 tablet 0     No facility-administered encounter medications on file as of 1/10/2025.       Review of patient's allergies indicates:   Allergen Reactions    Venom-honey bee Other (See Comments)     hives       Physical Exam      Vital Signs  Pulse: 72  Resp: 16  SpO2: 98 %  BP: 110/70  BP Location: Right arm  Patient Position: Sitting  Height and Weight  Height: 5' 4" (162.6 cm)  Weight: 99.3 kg (218 lb 14.7 oz)  BSA (Calculated - sq m): 2.12 sq meters  BMI (Calculated): 37.6  Weight in (lb) to have BMI = 25: 145.3    Physical Exam  Vitals and nursing note reviewed.   Constitutional:       General: She is not in acute distress.     Appearance: Normal appearance. She is well-developed. She is not ill-appearing.   HENT:      Head: Normocephalic and atraumatic.      Right Ear: External ear normal.      Left Ear: External ear normal.   Eyes:      Conjunctiva/sclera: Conjunctivae normal.      Pupils: Pupils are equal, round, and reactive to light.   Neck:      Thyroid: No thyromegaly.      Vascular: No JVD.      Trachea: No tracheal deviation.   Cardiovascular:      Rate and Rhythm: Normal rate and regular rhythm.      Heart sounds: Normal heart sounds. No murmur heard.  Pulmonary:      Effort: Pulmonary effort is normal.      Breath sounds: Normal breath sounds.   Chest:      Chest wall: No tenderness.   Abdominal:      General: Bowel sounds are normal.      " Palpations: Abdomen is soft.      Tenderness: There is no abdominal tenderness. There is no guarding.   Musculoskeletal:         General: Normal range of motion.      Cervical back: Normal range of motion and neck supple.   Lymphadenopathy:      Cervical: No cervical adenopathy.   Skin:     General: Skin is warm and dry.   Neurological:      General: No focal deficit present.      Mental Status: She is alert and oriented to person, place, and time.   Psychiatric:         Mood and Affect: Mood normal.         Behavior: Behavior normal.         Thought Content: Thought content normal.         Judgment: Judgment normal.          Laboratory:  CBC:  Lab Results   Component Value Date    WBC 8.96 01/10/2025    RBC 4.67 01/10/2025    HGB 12.8 01/10/2025    HCT 41.0 01/10/2025     01/10/2025    MCV 88 01/10/2025    MCH 27.4 01/10/2025    MCHC 31.2 (L) 01/10/2025    MCHC 32.3 05/24/2024    MCHC 31.9 (L) 11/17/2023     CMP:  Lab Results   Component Value Date    GLU 92 01/10/2025    CALCIUM 9.4 01/10/2025    ALBUMIN 3.9 01/10/2025    PROT 7.9 01/10/2025     01/10/2025    K 4.4 01/10/2025    CO2 24 01/10/2025     01/10/2025    BUN 10 01/10/2025    ALKPHOS 66 01/10/2025    ALT 14 01/10/2025    AST 17 01/10/2025    BILITOT 0.3 01/10/2025    BILITOT 0.2 11/17/2023    BILITOT 0.3 10/16/2023     URINALYSIS:  Lab Results   Component Value Date    PHUR 6.5 11/15/2023      LIPIDS:  Lab Results   Component Value Date    TSH 1.511 01/10/2025    TSH 1.107 10/16/2023    HDL 32 (L) 01/10/2025    HDL 34 (L) 10/16/2023    CHOL 177 01/10/2025    CHOL 183 10/16/2023    TRIG 57 01/10/2025    TRIG 53 10/16/2023    LDLCALC 133.6 01/10/2025    LDLCALC 138.4 10/16/2023    CHOLHDL 18.1 (L) 01/10/2025    CHOLHDL 18.6 (L) 10/16/2023    NONHDLCHOL 145 01/10/2025    NONHDLCHOL 149 10/16/2023    TOTALCHOLEST 5.5 (H) 01/10/2025    TOTALCHOLEST 5.4 (H) 10/16/2023     TSH:  Lab Results   Component Value Date    TSH 1.511 01/10/2025    TSH  1.107 10/16/2023     A1C:  Lab Results   Component Value Date    HGBA1C 5.4 01/10/2025         Assessment/Plan     Moriah Lee is a 29 y.o.female with:    Assessment & Plan    IMPRESSION:  - Considered patient's history of low B12 levels and current fatigue symptoms  - Will check comprehensive lab panel to assess for potential anemia or other deficiencies  - Evaluated patient's allergy symptoms and noted possible eardrum inflammation  - Assessed need for tetanus vaccination due to it being overdue    PHYSICAL EXAMINATION:  - Performed comprehensive physical exam including ear, throat, and lung check.  - Measured blood pressure, which was 110/70.    LABS:  - Ordered comprehensive lab panel including: CBC, metabolic panel, thyroid function tests, cholesterol panel, vitamin B12 level, vitamin D level, and iron studies.    TETANUS VACCINATION:  - Administered tetanus vaccine.  - Educated the patient on the importance of tetanus vaccination every 10 years.    DEPRESSION:  - Noted patient's history of depression.  - Assessed need for therapy and discussed options with the patient.  - Recommend therapy and discussed potential for medication if needed.  - Ordered vitamin D test and educated on its potential benefit for depression.  - Instructed the patient to contact the office if feeling the need for medication to address depression symptoms in addition to therapy.  - Educated on the role of vitamin D in depression and fatigue.    GRIEF COUNSELING:  - Assessed need for therapy due to grief from uncle's passing.  - Recommend therapy and provided referral.  - Instructed the patient to contact the office if feeling the need for medication to address anxiety symptoms in addition to therapy.  - Noted patient's report of uncle's death in July as reason for seeking therapy.  - Referred to therapy for grief counseling.    STOMACH ULCER:  - Noted patient's history of stomach ulcer.  - Suggested possible connection between  ulcer and anemia.    ALLERGIES:  - Observed signs of allergy in patient's ears.  - Prescribed Zyrtec as needed for allergy symptoms and itching.    BEE STING ALLERGY:  - Inquired about patient's allergic reaction to bee stings.  - Noted patient's history of severe swelling after bee sting.  - Confirmed hive-type reaction to bee stings.    FATIGUE:  - Discussed the potential link between low B12, iron deficiency, and fatigue.    VITAMIN D DEFICIENCY:  - Ordered vitamin D level.  - Educated on the role of vitamin D in bone health.  - Discussed potential dietary factors affecting vitamin D levels.         Annual physical exam  -     CBC Auto Differential; Future; Expected date: 01/10/2025  -     Comprehensive Metabolic Panel; Future; Expected date: 01/10/2025  -     Hemoglobin A1C; Future; Expected date: 01/10/2025  -     TSH; Future; Expected date: 01/10/2025  -     T4, Free; Future; Expected date: 01/10/2025  -     Lipid Panel; Future; Expected date: 01/10/2025  -     Vitamin B12; Future; Expected date: 01/10/2025  -     Iron and TIBC; Future; Expected date: 01/10/2025  -     Ferritin; Future; Expected date: 01/10/2025  -     Vitamin D; Future; Expected date: 01/10/2025    Grief  -     Ambulatory referral/consult to Psychiatry; Future; Expected date: 01/10/2025    Chronic tension-type headache, not intractable  -     CBC Auto Differential; Future; Expected date: 01/10/2025  -     Comprehensive Metabolic Panel; Future; Expected date: 01/10/2025  -     Hemoglobin A1C; Future; Expected date: 01/10/2025  -     TSH; Future; Expected date: 01/10/2025  -     T4, Free; Future; Expected date: 01/10/2025  -     Lipid Panel; Future; Expected date: 01/10/2025  -     Vitamin B12; Future; Expected date: 01/10/2025  -     Iron and TIBC; Future; Expected date: 01/10/2025  -     Ferritin; Future; Expected date: 01/10/2025  -     Vitamin D; Future; Expected date: 01/10/2025    Screening for diabetes mellitus  -     CBC Auto  Differential; Future; Expected date: 01/10/2025  -     Comprehensive Metabolic Panel; Future; Expected date: 01/10/2025  -     Hemoglobin A1C; Future; Expected date: 01/10/2025  -     TSH; Future; Expected date: 01/10/2025  -     T4, Free; Future; Expected date: 01/10/2025  -     Lipid Panel; Future; Expected date: 01/10/2025  -     Vitamin B12; Future; Expected date: 01/10/2025  -     Iron and TIBC; Future; Expected date: 01/10/2025  -     Ferritin; Future; Expected date: 01/10/2025  -     Vitamin D; Future; Expected date: 01/10/2025    Encounter for lipid screening for cardiovascular disease  -     CBC Auto Differential; Future; Expected date: 01/10/2025  -     Comprehensive Metabolic Panel; Future; Expected date: 01/10/2025  -     Hemoglobin A1C; Future; Expected date: 01/10/2025  -     TSH; Future; Expected date: 01/10/2025  -     T4, Free; Future; Expected date: 01/10/2025  -     Lipid Panel; Future; Expected date: 01/10/2025  -     Vitamin B12; Future; Expected date: 01/10/2025  -     Iron and TIBC; Future; Expected date: 01/10/2025  -     Ferritin; Future; Expected date: 01/10/2025  -     Vitamin D; Future; Expected date: 01/10/2025    Vitamin B12 deficiency  -     CBC Auto Differential; Future; Expected date: 01/10/2025  -     Comprehensive Metabolic Panel; Future; Expected date: 01/10/2025  -     Hemoglobin A1C; Future; Expected date: 01/10/2025  -     TSH; Future; Expected date: 01/10/2025  -     T4, Free; Future; Expected date: 01/10/2025  -     Lipid Panel; Future; Expected date: 01/10/2025  -     Vitamin B12; Future; Expected date: 01/10/2025  -     Iron and TIBC; Future; Expected date: 01/10/2025  -     Ferritin; Future; Expected date: 01/10/2025  -     Vitamin D; Future; Expected date: 01/10/2025    Vitamin D deficiency  -     CBC Auto Differential; Future; Expected date: 01/10/2025  -     Comprehensive Metabolic Panel; Future; Expected date: 01/10/2025  -     Hemoglobin A1C; Future; Expected date:  01/10/2025  -     TSH; Future; Expected date: 01/10/2025  -     T4, Free; Future; Expected date: 01/10/2025  -     Lipid Panel; Future; Expected date: 01/10/2025  -     Vitamin B12; Future; Expected date: 01/10/2025  -     Iron and TIBC; Future; Expected date: 01/10/2025  -     Ferritin; Future; Expected date: 01/10/2025  -     Vitamin D; Future; Expected date: 01/10/2025    Iron deficiency  -     CBC Auto Differential; Future; Expected date: 01/10/2025  -     Comprehensive Metabolic Panel; Future; Expected date: 01/10/2025  -     Hemoglobin A1C; Future; Expected date: 01/10/2025  -     TSH; Future; Expected date: 01/10/2025  -     T4, Free; Future; Expected date: 01/10/2025  -     Lipid Panel; Future; Expected date: 01/10/2025  -     Vitamin B12; Future; Expected date: 01/10/2025  -     Iron and TIBC; Future; Expected date: 01/10/2025  -     Ferritin; Future; Expected date: 01/10/2025  -     Vitamin D; Future; Expected date: 01/10/2025         Health Maintenance Due   Topic Date Due    TETANUS VACCINE  Never done    COVID-19 Vaccine (2 - 2024-25 season) 09/01/2024          I spent 34 minutes on the day of this encounter for preparing for, evaluating, treating, and managing this patient.        -Continue current medications and maintain follow up with specialists.  Return to clinic in 1 year sooner for any concerns No follow-ups on file.     This note was generated with the assistance of ambient listening technology. Verbal consent was obtained by the patient and accompanying visitor(s) for the recording of patient appointment to facilitate this note. I attest to having reviewed and edited the generated note for accuracy, though some syntax or spelling errors may persist. Please contact the author of this note for any clarification.         HEATHER Andres  [facility]

## 2025-01-13 DIAGNOSIS — E61.1 IRON DEFICIENCY: ICD-10-CM

## 2025-01-13 DIAGNOSIS — E55.9 VITAMIN D DEFICIENCY: Primary | ICD-10-CM

## 2025-01-13 RX ORDER — FERROUS SULFATE 325(65) MG
325 TABLET, DELAYED RELEASE (ENTERIC COATED) ORAL DAILY
Qty: 90 TABLET | Refills: 0 | Status: SHIPPED | OUTPATIENT
Start: 2025-01-13

## 2025-01-13 RX ORDER — ERGOCALCIFEROL 1.25 MG/1
50000 CAPSULE ORAL
Qty: 24 CAPSULE | Refills: 0 | Status: SHIPPED | OUTPATIENT
Start: 2025-01-13

## 2025-01-15 ENCOUNTER — OFFICE VISIT (OUTPATIENT)
Dept: URGENT CARE | Facility: CLINIC | Age: 30
End: 2025-01-15
Payer: COMMERCIAL

## 2025-01-15 VITALS
OXYGEN SATURATION: 99 % | SYSTOLIC BLOOD PRESSURE: 133 MMHG | HEIGHT: 64 IN | RESPIRATION RATE: 16 BRPM | BODY MASS INDEX: 37.22 KG/M2 | TEMPERATURE: 98 F | WEIGHT: 218 LBS | HEART RATE: 75 BPM | DIASTOLIC BLOOD PRESSURE: 82 MMHG

## 2025-01-15 DIAGNOSIS — N39.0 ACUTE UTI: Primary | ICD-10-CM

## 2025-01-15 LAB
B-HCG UR QL: NEGATIVE
BILIRUBIN, UA POC OHS: NEGATIVE
BLOOD, UA POC OHS: NEGATIVE
CLARITY, UA POC OHS: CLEAR
COLOR, UA POC OHS: YELLOW
CTP QC/QA: YES
GLUCOSE, UA POC OHS: NEGATIVE
KETONES, UA POC OHS: NEGATIVE
LEUKOCYTES, UA POC OHS: NEGATIVE
NITRITE, UA POC OHS: NEGATIVE
PH, UA POC OHS: 6.5
PROTEIN, UA POC OHS: NEGATIVE
SPECIFIC GRAVITY, UA POC OHS: 1.02
UROBILINOGEN, UA POC OHS: 0.2

## 2025-01-15 PROCEDURE — 99213 OFFICE O/P EST LOW 20 MIN: CPT | Mod: S$GLB,,, | Performed by: PHYSICIAN ASSISTANT

## 2025-01-15 PROCEDURE — 81003 URINALYSIS AUTO W/O SCOPE: CPT | Mod: QW,S$GLB,, | Performed by: PHYSICIAN ASSISTANT

## 2025-01-15 PROCEDURE — 81025 URINE PREGNANCY TEST: CPT | Mod: S$GLB,,, | Performed by: PHYSICIAN ASSISTANT

## 2025-01-15 RX ORDER — NITROFURANTOIN 25; 75 MG/1; MG/1
100 CAPSULE ORAL 2 TIMES DAILY
Qty: 10 CAPSULE | Refills: 0 | Status: SHIPPED | OUTPATIENT
Start: 2025-01-15 | End: 2025-01-20

## 2025-01-15 NOTE — PROGRESS NOTES
"Subjective:      Patient ID: Moriah Lee is a 29 y.o. female.    Vitals:  height is 5' 4" (1.626 m) and weight is 98.9 kg (218 lb). Her oral temperature is 98.4 °F (36.9 °C). Her blood pressure is 133/82 and her pulse is 75. Her respiration is 16 and oxygen saturation is 99%.     Chief Complaint: Abdominal Pain    This is a 29 y.o. female who presents today with a chief complaint of lower abdominal pain urinary frequency urgency and some burning that started Monday.       Abdominal Pain  This is a new problem. The current episode started in the past 7 days. The onset quality is sudden. The problem occurs constantly. The problem has been unchanged. The pain is located in the RLQ. The pain is at a severity of 6/10. The pain is mild. The quality of the pain is sharp. The abdominal pain does not radiate. Associated symptoms include frequency, hematuria and nausea. Pertinent negatives include no constipation, diarrhea, dysuria, fever, myalgias or vomiting. There is no history of abdominal surgery.     Constitution: Negative for appetite change, chills, fatigue and fever.   HENT:  Negative for ear pain and sore throat.    Cardiovascular:  Negative for chest pain.   Respiratory:  Negative for shortness of breath.    Gastrointestinal:  Positive for abdominal pain and nausea. Negative for history of abdominal surgery, vomiting, constipation and diarrhea.   Genitourinary:  Positive for frequency and hematuria. Negative for dysuria, urgency, urine decreased, vaginal discharge and vaginal bleeding.   Musculoskeletal:  Positive for back pain. Negative for muscle ache.   Skin:  Negative for rash.      Past Medical History:   Diagnosis Date    Allergy     Anxiety     Bipolar disorder     Depression     GERD (gastroesophageal reflux disease)        History reviewed. No pertinent surgical history.    Family History   Problem Relation Name Age of Onset    Breast cancer Mother Nisha Brain         anemia, eczema    Arthritis " Mother Nisha De La Paz     No Known Problems Father      ADD / ADHD Brother Sarthak Donaldson         asthma, depression    Asthma Brother Sarthak Donaldson     Diabetes Maternal Grandmother Vanessa Betancourt     No Known Problems Maternal Grandfather      Heart disease Paternal Grandmother Zohreh Ortiz     Cancer Paternal Grandmother Zohreh Ortiz     No Known Problems Paternal Grandfather      Alcohol abuse Maternal Aunt Marcelina Rodriguez     Alcohol abuse Maternal Uncle Gil Harkins     Alcohol abuse Paternal Uncle Gigi Alvarez     Alcohol abuse Paternal Aunt Lizy Castro        Social History     Socioeconomic History    Marital status: Single   Tobacco Use    Smoking status: Never     Passive exposure: Never    Smokeless tobacco: Never   Substance and Sexual Activity    Alcohol use: Not Currently    Drug use: Never    Sexual activity: Yes     Partners: Male     Birth control/protection: Coitus interruptus, Inserts     Social Drivers of Health     Financial Resource Strain: Low Risk  (3/13/2024)    Overall Financial Resource Strain (CARDIA)     Difficulty of Paying Living Expenses: Not very hard   Food Insecurity: No Food Insecurity (3/13/2024)    Hunger Vital Sign     Worried About Running Out of Food in the Last Year: Never true     Ran Out of Food in the Last Year: Never true   Transportation Needs: No Transportation Needs (3/13/2024)    PRAPARE - Transportation     Lack of Transportation (Medical): No     Lack of Transportation (Non-Medical): No   Physical Activity: Insufficiently Active (3/13/2024)    Exercise Vital Sign     Days of Exercise per Week: 3 days     Minutes of Exercise per Session: 30 min   Stress: No Stress Concern Present (3/13/2024)    Citizen of the [place] [employer] Occupational Health - Occupational Stress Questionnaire     Feeling of Stress : Only a little   Housing Stability: High Risk (3/13/2024)    Housing Stability Vital Sign     Unable to Pay for Housing in the Last Year: Yes     Number of Places Lived in the  Last Year: 2     Unstable Housing in the Last Year: No       Current Outpatient Medications   Medication Sig Dispense Refill    ergocalciferol (ERGOCALCIFEROL) 50,000 unit Cap Take 1 capsule (50,000 Units total) by mouth every 7 days. 24 capsule 0    etonogestreL-ethinyl estradioL (NUVARING) 0.12-0.015 mg/24 hr vaginal ring Place 1 each vaginally every 21 days. Insert one (1) ring vaginally and leave in place for three (3) weeks, then remove for one (1) week. 4 each 3    ferrous sulfate 325 (65 FE) MG EC tablet Take 1 tablet (325 mg total) by mouth once daily. 90 tablet 0    nitrofurantoin, macrocrystal-monohydrate, (MACROBID) 100 MG capsule Take 1 capsule (100 mg total) by mouth 2 (two) times daily. for 5 days 10 capsule 0     No current facility-administered medications for this visit.       Review of patient's allergies indicates:   Allergen Reactions    Venom-honey bee Other (See Comments)     hives       Objective:     Physical Exam   Constitutional:  Non-toxic appearance. She does not appear ill. No distress.   Eyes: Conjunctivae are normal. No scleral icterus.   Cardiovascular: Normal rate, regular rhythm and normal heart sounds.   No murmur heard.Exam reveals no gallop and no friction rub.   Pulmonary/Chest: Effort normal and breath sounds normal. No stridor. No respiratory distress. She has no wheezes. She has no rhonchi. She has no rales.   Abdominal: Normal appearance and bowel sounds are normal. She exhibits no distension and no mass. Soft. flat abdomen There is abdominal tenderness in the suprapubic area. There is no rebound, no guarding, no tenderness at McBurney's point, negative Sr's sign, no left CVA tenderness, negative Rovsing's sign and no right CVA tenderness. No hernia.   Neurological: She is alert.   Skin: Skin is warm, dry, not diaphoretic and no rash.   Psychiatric: Her behavior is normal. Mood normal.   Nursing note and vitals reviewed.    Results for orders placed or performed in visit  on 01/15/25   POCT urine pregnancy    Collection Time: 01/15/25  3:32 PM   Result Value Ref Range    POC Preg Test, Ur Negative Negative     Acceptable Yes    POCT Urinalysis(Instrument)    Collection Time: 01/15/25  3:32 PM   Result Value Ref Range    Color, POC UA Yellow Yellow, Straw, Colorless    Clarity, POC UA Clear Clear    Glucose, POC UA Negative Negative    Bilirubin, POC UA Negative Negative    Ketones, POC UA Negative Negative    Spec Grav POC UA 1.020 1.005 - 1.030    Blood, POC UA Negative Negative    pH, POC UA 6.5 5.0 - 8.0    Protein, POC UA Negative Negative    Urobilinogen, POC UA 0.2 <=1.0    Nitrite, POC UA Negative Negative    WBC, POC UA Negative Negative       Assessment:     1. Acute UTI        Plan:       Acute UTI  -     POCT urine pregnancy  -     POCT Urinalysis(Instrument)  -     nitrofurantoin, macrocrystal-monohydrate, (MACROBID) 100 MG capsule; Take 1 capsule (100 mg total) by mouth 2 (two) times daily. for 5 days  Dispense: 10 capsule; Refill: 0      Results reviewed  I have reviewed the patient chart and pertinent past imaging/labs.       Pt declines pyridium or zofran     Patient Instructions   If your condition worsens or fails to improve we recommend that you receive another evaluation at the ER immediately or contact your PCP to discuss your concerns or return here. You must understand that you've received an urgent care treatment only and that you may be released before all your medical problems are known or treated. You the patient will arrange for followup care as instructed.       If you are are female and on BCP use additional methods to prevent pregnancy while on the antibiotics and for one cycle after.     Cranberry juice may help. Get the 100% cranberry juice and mix 4 oz of juice with 4 oz of water and drink this 8 oz glass of liquid once a day.   Increase water intake to at least 8-10 glasses/day.      Avoid caffeine, alcohol, or spicy foods as they  irritate the bladder.      If symptoms do not improve in 2-3 days please return for evaluation

## 2025-01-25 ENCOUNTER — OFFICE VISIT (OUTPATIENT)
Dept: URGENT CARE | Facility: CLINIC | Age: 30
End: 2025-01-25
Payer: COMMERCIAL

## 2025-01-25 VITALS
WEIGHT: 218 LBS | HEART RATE: 73 BPM | RESPIRATION RATE: 16 BRPM | BODY MASS INDEX: 37.22 KG/M2 | TEMPERATURE: 99 F | HEIGHT: 64 IN | DIASTOLIC BLOOD PRESSURE: 85 MMHG | SYSTOLIC BLOOD PRESSURE: 125 MMHG | OXYGEN SATURATION: 98 %

## 2025-01-25 DIAGNOSIS — R10.31 RIGHT LOWER QUADRANT ABDOMINAL PAIN: Primary | ICD-10-CM

## 2025-01-25 PROCEDURE — 99213 OFFICE O/P EST LOW 20 MIN: CPT | Mod: S$GLB,,, | Performed by: FAMILY MEDICINE

## 2025-01-25 NOTE — PROGRESS NOTES
"Subjective:      Patient ID: Moriah Lee is a 29 y.o. female.    Vitals:  height is 5' 4" (1.626 m) and weight is 98.9 kg (218 lb). Her oral temperature is 98.8 °F (37.1 °C). Her blood pressure is 125/85 and her pulse is 73. Her respiration is 16 and oxygen saturation is 98%.     Chief Complaint: Abdominal Pain (Doctor told me to go to urgent care for abdominal pain for relief. Right side lower abdominal sharp pain and nausea - Entered by patient)    This is a 29 y.o. female who presents today with a chief complaint of abdomina; pain.  Patient has been having abdominal pain for about  two -three weeks.  Patient was seen  a week ago for a UTI for which she was appropriately treated.      Abdominal Pain  This is a new problem. The current episode started 1 to 4 weeks ago. The onset quality is undetermined. The problem occurs constantly. The problem has been unchanged. The pain is located in the generalized abdominal region. The pain is at a severity of 6/10. The pain is moderate. The quality of the pain is sharp. The abdominal pain radiates to the LUQ. Nothing aggravates the pain. The pain is relieved by Nothing. Treatments tried: ibuprofen. The treatment provided no relief. Patient's medical history includes UTI.       Gastrointestinal:  Positive for abdominal pain.      Objective:     Physical Exam   Constitutional: She is oriented to person, place, and time. She appears well-developed.   HENT:   Head: Normocephalic and atraumatic.   Ears:   Right Ear: External ear normal.   Left Ear: External ear normal.   Nose: Nose normal.   Mouth/Throat: Mucous membranes are normal.   Eyes: Conjunctivae and lids are normal.   Neck: Trachea normal. Neck supple.   Cardiovascular: Normal rate, regular rhythm and normal heart sounds.   Pulmonary/Chest: Effort normal and breath sounds normal. No respiratory distress.   Abdominal: Normal appearance and bowel sounds are normal. She exhibits no distension and no mass. Soft. There " is no abdominal tenderness.     Musculoskeletal: Normal range of motion.         General: Normal range of motion.   Neurological: She is alert and oriented to person, place, and time. She has normal strength.   Skin: Skin is warm, dry, intact, not diaphoretic and not pale.   Psychiatric: Her speech is normal and behavior is normal. Judgment and thought content normal.   Nursing note and vitals reviewed.      Assessment:     1. Right lower quadrant abdominal pain        Plan:       Right lower quadrant abdominal pain      I have a high index of suspicion for a possible ovarian cyst. Appendicitis is low on the differential. I have encouraged patient to schedule an appointment with her GYN at her earliest convenience. She will need a pelvic ultrasound. She was also just treated for a bladder infection a week ago ( She does not have those symptoms any longer). ED precautions discussed.

## 2025-01-31 ENCOUNTER — OFFICE VISIT (OUTPATIENT)
Dept: OBSTETRICS AND GYNECOLOGY | Facility: CLINIC | Age: 30
End: 2025-01-31
Payer: COMMERCIAL

## 2025-01-31 VITALS
SYSTOLIC BLOOD PRESSURE: 111 MMHG | BODY MASS INDEX: 37.46 KG/M2 | DIASTOLIC BLOOD PRESSURE: 79 MMHG | WEIGHT: 218.25 LBS

## 2025-01-31 DIAGNOSIS — R10.2 PELVIC PAIN: Primary | ICD-10-CM

## 2025-01-31 DIAGNOSIS — N76.0 ACUTE VAGINITIS: ICD-10-CM

## 2025-01-31 PROCEDURE — 3008F BODY MASS INDEX DOCD: CPT | Mod: CPTII,S$GLB,,

## 2025-01-31 PROCEDURE — 81515 NFCT DS BV&VAGINITIS DNA ALG: CPT

## 2025-01-31 PROCEDURE — 3044F HG A1C LEVEL LT 7.0%: CPT | Mod: CPTII,S$GLB,,

## 2025-01-31 PROCEDURE — 99999 PR PBB SHADOW E&M-EST. PATIENT-LVL III: CPT | Mod: PBBFAC,,,

## 2025-01-31 PROCEDURE — 1159F MED LIST DOCD IN RCRD: CPT | Mod: CPTII,S$GLB,,

## 2025-01-31 PROCEDURE — 1160F RVW MEDS BY RX/DR IN RCRD: CPT | Mod: CPTII,S$GLB,,

## 2025-01-31 PROCEDURE — 99213 OFFICE O/P EST LOW 20 MIN: CPT | Mod: S$GLB,,,

## 2025-01-31 PROCEDURE — 3074F SYST BP LT 130 MM HG: CPT | Mod: CPTII,S$GLB,,

## 2025-01-31 PROCEDURE — 3078F DIAST BP <80 MM HG: CPT | Mod: CPTII,S$GLB,,

## 2025-01-31 NOTE — PROGRESS NOTES
"SUBJECTIVE:   29 y.o. female  presents for lower abdominal pain (R>L) which onset x1 month ago. Was seen at urgent care twice this month for symptoms-with negative urinalysis and UPT. They referred her to gyn for pelvic US. Pain is "stabbing", intermittent, and rated 7/10 at it's worst. Nothing makes it better or worse. She has had this pain in the past several years ago- pain eventually went away. She is having white thin vaginal discharge for a while.  History of BV in the past, most recent in 2024-treated with oral flagyl with improvement.   Patient's last menstrual period was 2025. Periods are regular, light, lasting 5 days. Mild dysmenorrhea. Denies any abnormal bleeding, itching, irritation, or odor. Denies any urinary or bowel symptoms.   Is currently sexually active with male. She is using vaginal ring (Nuvaring) for contraception. Declines STD testing with blood work.    Pap 2024- due this year based on history  -History of abnormal pap-yes, LGSIL-KARON I, HPV 2024, colpo 2024 with normal findings in endocervix from second sample, not enough tissue to determine the ectocervix.             Past Medical History:   Diagnosis Date    Allergy     Anxiety     Bipolar disorder     Depression     GERD (gastroesophageal reflux disease)      No past surgical history on file.  Social History     Socioeconomic History    Marital status: Single   Tobacco Use    Smoking status: Never     Passive exposure: Never    Smokeless tobacco: Never   Substance and Sexual Activity    Alcohol use: Not Currently    Drug use: Never    Sexual activity: Yes     Partners: Male     Birth control/protection: Coitus interruptus, Inserts     Social Drivers of Health     Financial Resource Strain: Low Risk  (3/13/2024)    Overall Financial Resource Strain (CARDIA)     Difficulty of Paying Living Expenses: Not very hard   Food Insecurity: No Food Insecurity (3/13/2024)    Hunger Vital Sign     Worried About Running Out " of Food in the Last Year: Never true     Ran Out of Food in the Last Year: Never true   Transportation Needs: No Transportation Needs (3/13/2024)    PRAPARE - Transportation     Lack of Transportation (Medical): No     Lack of Transportation (Non-Medical): No   Physical Activity: Insufficiently Active (3/13/2024)    Exercise Vital Sign     Days of Exercise per Week: 3 days     Minutes of Exercise per Session: 30 min   Stress: No Stress Concern Present (3/13/2024)    [employer] - Occupational Stress Questionnaire     Feeling of Stress : Only a little   Housing Stability: High Risk (3/13/2024)    Housing Stability Vital Sign     Unable to Pay for Housing in the Last Year: Yes     Number of Places Lived in the Last Year: 2     Unstable Housing in the Last Year: No     Family History   Problem Relation Name Age of Onset    Breast cancer Mother Nisha De La Paz         anemia, eczema    Arthritis Mother Nisha De La Paz     No Known Problems Father      ADD / ADHD Brother Sarthak Donaldson         asthma, depression    Asthma Brother Sarthak Donaldson     Diabetes Maternal Grandmother Vanessa Betancourt     No Known Problems Maternal Grandfather      Heart disease Paternal Grandmother Zohreh Ortiz     Cancer Paternal Grandmother Zohreh Ortiz     No Known Problems Paternal Grandfather      Alcohol abuse Maternal Aunt Marcelina Rodriguez     Alcohol abuse Maternal Uncle Gil Harkins     Alcohol abuse Paternal Uncle Gigi Alvarez     Alcohol abuse Paternal Aunt Lizy Castro      OB History    Para Term  AB Living   0 0 0 0 0 0   SAB IAB Ectopic Multiple Live Births   0 0 0 0 0         Current Outpatient Medications   Medication Sig Dispense Refill    ergocalciferol (ERGOCALCIFEROL) 50,000 unit Cap Take 1 capsule (50,000 Units total) by mouth every 7 days. 24 capsule 0    etonogestreL-ethinyl estradioL (NUVARING) 0.12-0.015 mg/24 hr vaginal ring Place 1 each vaginally every 21 days. Insert one (1) ring  vaginally and leave in place for three (3) weeks, then remove for one (1) week. 4 each 3    ferrous sulfate 325 (65 FE) MG EC tablet Take 1 tablet (325 mg total) by mouth once daily. 90 tablet 0     No current facility-administered medications for this visit.     Allergies: Venom-honey bee     ROS:  Constitutional: no weight loss, weight gain, fever, fatigue  Eyes:  No vision changes, glasses/contacts  ENT/Mouth: No ulcers, sinus problems, ears ringing, headache  Cardiovascular: No inability to lie flat, chest pain, exercise intolerance, swelling, heart palpitations  Respiratory: No wheezing, coughing blood, shortness of breath, or cough  Gastrointestinal: No diarrhea, bloody stool, nausea/vomiting, constipation, gas, hemorrhoids  Genitourinary: +vaginal discharge +pelvic pain. No blood in urine, painful urination, urgency of urination, frequency of urination, incomplete emptying, incontinence, abnormal bleeding, painful periods, heavy periods, vaginal odor, painful intercourse, sexual problems, bleeding after intercourse.  Musculoskeletal: No muscle weakness  Skin/Breast: No painful breasts, nipple discharge, masses, rash, ulcers  Neurological: No passing out, seizures, numbness, headache  Endocrine: No hot flashes, hair loss, abnormal hair growth, ance  Psychiatric: No depression, crying  Hematologic: No bruises, bleeding, swollen lymph nodes, anemia.      OBJECTIVE:   The patient appears well, alert, oriented x 3, in no distress.  /79 (BP Location: Left arm, Patient Position: Sitting)   Wt 99 kg (218 lb 4.1 oz)   LMP 01/05/2025   BMI 37.46 kg/m²   NECK: no thyromegaly, trachea midline  SKIN: no acne, striae, hirsutism  BREAST EXAM: not examined  ABDOMEN: no hernias, masses, or hepatosplenomegaly  GENITALIA: normal external genitalia, no erythema, no discharge  URETHRA: normal urethra, normal urethral meatus  VAGINA: vaginal discharge white, thin, and watery  CERVIX: no lesions or cervical motion  tenderness. Nuva ring in place.  UTERUS: normal  ADNEXA: no mass, fullness, tenderness      ASSESSMENT:   Moriah was seen today for abdominal pain.    Diagnoses and all orders for this visit:    Pelvic pain  -     US Pelvis Comp with Transvag NON-OB (xpd; Future    Acute vaginitis  -     Vaginosis Screen by DNA Probe        Orders Placed This Encounter   Procedures    US Pelvis Comp with Transvag NON-OB (xpd    Vaginosis Screen by DNA Probe     She is unable to provide urine sample today-recent negative UPT and urinalysis at Urgent care. She uses nuvaring for contraception.  Discussed different causes of pelvic pain in detail.   She will schedule annual visit for this year.  Mohini Solorio PA-C

## 2025-02-05 DIAGNOSIS — N76.0 BACTERIAL VAGINOSIS: Primary | ICD-10-CM

## 2025-02-05 DIAGNOSIS — B96.89 BACTERIAL VAGINOSIS: Primary | ICD-10-CM

## 2025-02-05 LAB
BACTERIAL VAGINOSIS DNA: DETECTED
CANDIDA GLABRATA/KRUSEI: NOT DETECTED
CANDIDA RRNA VAG QL PROBE: NOT DETECTED
TRICHOMONAS VAGINALIS: NOT DETECTED

## 2025-02-05 RX ORDER — METRONIDAZOLE 500 MG/1
500 TABLET ORAL EVERY 12 HOURS
Qty: 14 TABLET | Refills: 0 | Status: SHIPPED | OUTPATIENT
Start: 2025-02-05

## 2025-04-03 ENCOUNTER — OFFICE VISIT (OUTPATIENT)
Dept: OBSTETRICS AND GYNECOLOGY | Facility: CLINIC | Age: 30
End: 2025-04-03
Payer: COMMERCIAL

## 2025-04-03 VITALS
SYSTOLIC BLOOD PRESSURE: 125 MMHG | WEIGHT: 213.81 LBS | HEIGHT: 64 IN | DIASTOLIC BLOOD PRESSURE: 89 MMHG | BODY MASS INDEX: 36.5 KG/M2

## 2025-04-03 DIAGNOSIS — Z01.419 WELL WOMAN EXAM WITH ROUTINE GYNECOLOGICAL EXAM: Primary | ICD-10-CM

## 2025-04-03 DIAGNOSIS — Z30.09 ENCOUNTER FOR COUNSELING REGARDING CONTRACEPTION: ICD-10-CM

## 2025-04-03 PROCEDURE — 99999 PR PBB SHADOW E&M-EST. PATIENT-LVL III: CPT | Mod: PBBFAC,,, | Performed by: STUDENT IN AN ORGANIZED HEALTH CARE EDUCATION/TRAINING PROGRAM

## 2025-04-03 RX ORDER — ETONOGESTREL AND ETHINYL ESTRADIOL VAGINAL RING .015; .12 MG/D; MG/D
1 RING VAGINAL
Qty: 4 EACH | Refills: 3 | Status: SHIPPED | OUTPATIENT
Start: 2025-04-03 | End: 2026-04-03

## 2025-04-03 NOTE — PROGRESS NOTES
CC: Well woman exam    HPI:  Moriah Lee is a 29 y.o. female  presents for a well woman exam.  She is having pelvic pain lately, right side worse today. Uses heating pad with some relief. Saw PA on 2025 and didn't proceed with US at that time since there is no treatment if she had cysts.       Patient history:   Past Medical History:   Diagnosis Date    Allergy     Anxiety     Bipolar disorder     Depression     GERD (gastroesophageal reflux disease)      History reviewed. No pertinent surgical history.  OB History    Para Term  AB Living   0 0 0 0 0 0   SAB IAB Ectopic Multiple Live Births   0 0 0 0 0       GYN  Menopausal: No  History of abnormal paps:  LGSIL , colpo insufficient tissue  Abnormal or postmenopausal bleeding:  prolonged periods  History of abnormal mammograms:N/A   Family history of breast or ovarian cancer:  breast  Any breast masses, pain, skin changes, or nipple discharge: DENIES  Possible recent STD exposure: denies  Contraception: Patch    Pap: 2024, Done today  Mammogram: N/A      Family History   Problem Relation Name Age of Onset    Breast cancer Mother Nisha De La Paz         anemia, eczema    Arthritis Mother Nisha De La Paz     No Known Problems Father      ADD / ADHD Brother Sarthak Donaldson         asthma, depression    Asthma Brother Sarthak Donaldson     Diabetes Maternal Grandmother Vanessa Betancourt     No Known Problems Maternal Grandfather      Heart disease Paternal Grandmother Zohreh Ortiz     Cancer Paternal Grandmother Zohreh Ortiz     No Known Problems Paternal Grandfather      Alcohol abuse Maternal Aunt Marcelina Angelola     Alcohol abuse Maternal Uncle Gil Harkins     Alcohol abuse Paternal Uncle Gigi Alvarez     Alcohol abuse Paternal Aunt Lizy Matthew      Social History     Tobacco Use    Smoking status: Never     Passive exposure: Never    Smokeless tobacco: Never   Substance Use Topics    Alcohol use: Not Currently    Drug use: Never     Allergies:  "Venom-honey bee    Current Outpatient Medications:     ergocalciferol (ERGOCALCIFEROL) 50,000 unit Cap, Take 1 capsule (50,000 Units total) by mouth every 7 days., Disp: 24 capsule, Rfl: 0    ferrous sulfate 325 (65 FE) MG EC tablet, Take 1 tablet (325 mg total) by mouth once daily., Disp: 90 tablet, Rfl: 0    metroNIDAZOLE (FLAGYL) 500 MG tablet, Take 1 tablet (500 mg total) by mouth every 12 (twelve) hours., Disp: 14 tablet, Rfl: 0    etonogestreL-ethinyl estradioL (NUVARING) 0.12-0.015 mg/24 hr vaginal ring, Place 1 each vaginally every 21 days. Insert one (1) ring vaginally and leave in place for three (3) weeks, then remove for one (1) week., Disp: 4 each, Rfl: 3       ROS:  GENERAL: Denies weight gain or weight loss. Feeling well overall.   SKIN: Denies rash or lesions.   HEAD: Denies head injury or headache.   NODES: Denies enlarged lymph nodes.   CHEST: Denies chest pain or shortness of breath.   CARDIOVASCULAR: Denies palpitations or left sided chest pain.   ABDOMEN: No abdominal pain, constipation, diarrhea, nausea, vomiting or rectal bleeding.   URINARY: No frequency, dysuria, hematuria, or burning on urination.  REPRODUCTIVE: See HPI.   BREASTS: The patient performs breast self-examination and denies pain, lumps, or nipple discharge.   HEMATOLOGIC: No easy bruisability or excessive bleeding.  MUSCULOSKELETAL: Denies joint pain or swelling.   NEUROLOGIC: Denies syncope or weakness.   PSYCHIATRIC: Denies depression, anxiety or mood swings.    Objective:   /89   Ht 5' 4" (1.626 m)   Wt 97 kg (213 lb 12.8 oz)   LMP 03/09/2025 (Exact Date)   BMI 36.70 kg/m²       Physical Exam:  APPEARANCE: Well nourished, well developed, in no acute distress.  AFFECT: WNL, alert and oriented x 3  SKIN: No acne or hirsutism  NECK: Neck symmetric without masses or thyromegaly  CHEST: Good respiratory effect  ABDOMEN: Soft.  No tenderness or masses.  No hepatosplenomegaly.  No hernias.  BREASTS: deferred  PELVIC: " Normal external genitalia without lesions.  Normal hair distribution.  Adequate perineal body, normal urethral meatus.  Vagina moist and well rugated without lesions or discharge.  Cervix pink, without lesions, discharge or tenderness.  No significant cystocele or rectocele.  EXTREMITIES: No edema.    ASSESSMENT AND PLAN  1. Well woman exam with routine gynecological exam  Liquid-Based Pap Smear, Screening      2. Encounter for counseling regarding contraception  etonogestreL-ethinyl estradioL (NUVARING) 0.12-0.015 mg/24 hr vaginal ring          Annual exam  pelvic exam: wnl  Patient counseled on ASCCP guidelines for cervical cytology screening  Cervical screening: done today   Patient counseled on current recommendations for breast cancer screening  Mammogram screening: at 40  STD testing: not requested today   Contraception: nuvaring refilled   Osteoporosis screening at 65      She was counseled to follow up with her PCP for other routine health maintenance      Follow up in about 1 year (around 4/3/2026), or if symptoms worsen or fail to improve.      Hollie Bedolla MD  OBGYN [facility]

## 2025-04-10 DIAGNOSIS — E61.1 IRON DEFICIENCY: ICD-10-CM

## 2025-04-10 RX ORDER — FERROUS SULFATE 325(65) MG
TABLET ORAL
Qty: 90 TABLET | Refills: 1 | Status: SHIPPED | OUTPATIENT
Start: 2025-04-10

## 2025-04-16 ENCOUNTER — RESULTS FOLLOW-UP (OUTPATIENT)
Dept: OBSTETRICS AND GYNECOLOGY | Facility: CLINIC | Age: 30
End: 2025-04-16

## 2025-05-07 ENCOUNTER — HOSPITAL ENCOUNTER (EMERGENCY)
Facility: HOSPITAL | Age: 30
Discharge: HOME | End: 2025-05-07
Attending: EMERGENCY MEDICINE
Payer: COMMERCIAL

## 2025-05-07 VITALS
TEMPERATURE: 99 F | SYSTOLIC BLOOD PRESSURE: 112 MMHG | OXYGEN SATURATION: 99 % | HEART RATE: 65 BPM | BODY MASS INDEX: 34.73 KG/M2 | RESPIRATION RATE: 16 BRPM | WEIGHT: 196 LBS | DIASTOLIC BLOOD PRESSURE: 78 MMHG | HEIGHT: 63 IN

## 2025-05-07 DIAGNOSIS — G43.109 MIGRAINE WITH AURA AND WITHOUT STATUS MIGRAINOSUS, NOT INTRACTABLE: Primary | ICD-10-CM

## 2025-05-07 DIAGNOSIS — R07.9 CHEST PAIN: ICD-10-CM

## 2025-05-07 LAB
B-HCG UR QL: NEGATIVE
BUN SERPL-MCNC: 9 MG/DL (ref 6–30)
CHLORIDE SERPL-SCNC: 106 MMOL/L (ref 95–110)
CREAT SERPL-MCNC: 0.8 MG/DL (ref 0.5–1.4)
CTP QC/QA: YES
GLUCOSE SERPL-MCNC: 100 MG/DL (ref 70–110)
HCT VFR BLD CALC: 43 %PCV (ref 36–54)
HCV AB SERPL QL IA: NORMAL
HIV 1+2 AB+HIV1 P24 AG SERPL QL IA: NORMAL
OHS QRS DURATION: 82 MS
OHS QTC CALCULATION: 436 MS
POC IONIZED CALCIUM: 1.2 MMOL/L (ref 1.06–1.42)
POC TCO2 (MEASURED): 25 MMOL/L (ref 23–29)
POTASSIUM BLD-SCNC: 4.2 MMOL/L (ref 3.5–5.1)
SAMPLE: NORMAL
SODIUM BLD-SCNC: 143 MMOL/L (ref 136–145)

## 2025-05-07 PROCEDURE — 80047 BASIC METABLC PNL IONIZED CA: CPT

## 2025-05-07 PROCEDURE — 86803 HEPATITIS C AB TEST: CPT | Performed by: PHYSICIAN ASSISTANT

## 2025-05-07 PROCEDURE — 93010 ELECTROCARDIOGRAM REPORT: CPT | Mod: ,,, | Performed by: INTERNAL MEDICINE

## 2025-05-07 PROCEDURE — 93005 ELECTROCARDIOGRAM TRACING: CPT

## 2025-05-07 PROCEDURE — 99284 EMERGENCY DEPT VISIT MOD MDM: CPT | Mod: 25

## 2025-05-07 PROCEDURE — 63600175 PHARM REV CODE 636 W HCPCS: Performed by: EMERGENCY MEDICINE

## 2025-05-07 PROCEDURE — 96361 HYDRATE IV INFUSION ADD-ON: CPT

## 2025-05-07 PROCEDURE — 96374 THER/PROPH/DIAG INJ IV PUSH: CPT

## 2025-05-07 PROCEDURE — 81025 URINE PREGNANCY TEST: CPT | Performed by: EMERGENCY MEDICINE

## 2025-05-07 PROCEDURE — 87389 HIV-1 AG W/HIV-1&-2 AB AG IA: CPT | Performed by: PHYSICIAN ASSISTANT

## 2025-05-07 PROCEDURE — 96375 TX/PRO/DX INJ NEW DRUG ADDON: CPT

## 2025-05-07 RX ORDER — PROCHLORPERAZINE EDISYLATE 5 MG/ML
10 INJECTION INTRAMUSCULAR; INTRAVENOUS
Status: COMPLETED | OUTPATIENT
Start: 2025-05-07 | End: 2025-05-07

## 2025-05-07 RX ORDER — KETOROLAC TROMETHAMINE 30 MG/ML
10 INJECTION, SOLUTION INTRAMUSCULAR; INTRAVENOUS
Status: COMPLETED | OUTPATIENT
Start: 2025-05-07 | End: 2025-05-07

## 2025-05-07 RX ADMIN — SODIUM CHLORIDE, POTASSIUM CHLORIDE, SODIUM LACTATE AND CALCIUM CHLORIDE 500 ML: 600; 310; 30; 20 INJECTION, SOLUTION INTRAVENOUS at 01:05

## 2025-05-07 RX ADMIN — PROCHLORPERAZINE EDISYLATE 10 MG: 5 INJECTION INTRAMUSCULAR; INTRAVENOUS at 01:05

## 2025-05-07 RX ADMIN — KETOROLAC TROMETHAMINE 10 MG: 30 INJECTION, SOLUTION INTRAMUSCULAR; INTRAVENOUS at 01:05

## 2025-05-07 NOTE — ED NOTES
I-STAT Chem-8+ Results:   Value Reference Range   Sodium 143 136-145 mmol/L   Potassium  4.2 3.5-5.1 mmol/L   Chloride 106  mmol/L   Ionized Calcium 1.20 1.06-1.42 mmol/L   CO2 (measured) 25 23-29 mmol/L   Glucose 100  mg/dL   BUN 9 6-30 mg/dL   Creatinine 0.8 0.5-1.4 mg/dL   Hematocrit 43 36-54%

## 2025-05-07 NOTE — ED TRIAGE NOTES
Moriah Lee, a 29 y.o. female presents to the ED w/ complaint of chest pain and migraine. Pt states sx started last week, denies cardiac hx, denies SOB at this time.    Triage note:  Chief Complaint   Patient presents with    Multiple complaints      Pt endorses chest pain and migraines that started last week. Denies cardiac history.        Review of patient's allergies indicates:   Allergen Reactions    Venom-honey bee Other (See Comments)     hives     Past Medical History:   Diagnosis Date    Allergy     Anxiety     Bipolar disorder     Depression     GERD (gastroesophageal reflux disease)

## 2025-05-07 NOTE — ED PROVIDER NOTES
Encounter Date: 5/7/2025       History     Chief Complaint   Patient presents with    Multiple complaints      Pt endorses chest pain and migraines that started last week. Denies cardiac history.        Patient is a 29-year-old female with a past medical history of bipolar disorder, depression, GERD, migraines presenting today for his pain and a migraine x1 week.  She has been having chest pain, described as tightness across her chest for the past 1 week.  She then started to develop a migraine, described as sharp stabbing, located in the right posterior head without radiation.  No photophobia or phonophobia.  States these are typical of her usual migraine episodes.  She tried Maxalt x2 which did not relieve her symptoms.  She also had nausea.  No recent fevers or chills.  Denies shortness of breath.  No leg swelling.         Review of patient's allergies indicates:   Allergen Reactions    Venom-honey bee Other (See Comments)     hives     Past Medical History:   Diagnosis Date    Allergy     Anxiety     Bipolar disorder     Depression     GERD (gastroesophageal reflux disease)      History reviewed. No pertinent surgical history.  Family History   Problem Relation Name Age of Onset    Breast cancer Mother Nisha De La Paz         anemia, eczema    Arthritis Mother Nisha De La Paz     No Known Problems Father      ADD / ADHD Brother Sarthak Donaldson         asthma, depression    Asthma Brother Sarthak Donaldson     Diabetes Maternal Grandmother Vanessa Betancourt     No Known Problems Maternal Grandfather      Heart disease Paternal Grandmother Zohreh Ortiz     Cancer Paternal Grandmother Zohreh Ortiz     No Known Problems Paternal Grandfather      Alcohol abuse Maternal Aunt Marcelina Michael     Alcohol abuse Maternal Uncle Gil Harkins     Alcohol abuse Paternal Uncle Gigi Alvarez     Alcohol abuse Paternal Aunt Lizy Castro      Social History[1]  Review of Systems    Physical Exam     Initial Vitals [05/07/25 1217]   BP Pulse Resp  Temp SpO2   (!) 146/65 92 20 98.9 °F (37.2 °C) 99 %      MAP       --         Physical Exam    Nursing note and vitals reviewed.  Constitutional: She appears well-developed and well-nourished. No distress.   HENT: Mouth/Throat: Oropharynx is clear and moist.   Eyes: Conjunctivae are normal. No scleral icterus.   Neck: No JVD present.   Cardiovascular:  Normal rate, regular rhythm and intact distal pulses.           Pulmonary/Chest: Breath sounds normal. No respiratory distress. She has no wheezes. She has no rales.   Abdominal: Abdomen is soft. Bowel sounds are normal. She exhibits no distension. There is no abdominal tenderness. There is no rebound.   Musculoskeletal:      Comments: No edema noted in the lower extremities, no calf tenderness or asymmetry.  No palpable cord.     Lymphadenopathy:     She has no cervical adenopathy.   Neurological: She is alert and oriented to person, place, and time. She has normal strength. No cranial nerve deficit or sensory deficit.   Skin: Skin is warm. No rash noted.         ED Course   Procedures  Labs Reviewed   HEPATITIS C ANTIBODY   HEP C VIRUS HOLD SPECIMEN   HIV 1 / 2 ANTIBODY   POCT URINE PREGNANCY   ISTAT CHEM8     EKG Readings: (Independently Interpreted)   Sinus rhythm at 97 with normal intervals, normal axis, no acute ischemic changes.       ECG Results              EKG 12-lead (Final result)        Collection Time Result Time QRS Duration OHS QTC Calculation    05/07/25 12:18:47 05/07/25 12:55:22 82 436                     Final result by Interface, Lab In [facility] (05/07/25 12:55:31)                   Narrative:    Test Reason : R07.9,    Vent. Rate :  97 BPM     Atrial Rate :  97 BPM     P-R Int : 134 ms          QRS Dur :  82 ms      QT Int : 344 ms       P-R-T Axes :  49  26  47 degrees    QTcB Int : 436 ms    Normal sinus rhythm  Normal ECG  When compared with ECG of 17-Nov-2023 07:37,  No significant change was found  Confirmed by Blaine Krishna ([id number]) on  5/7/2025 12:55:18 PM    Referred By: AAAREFERRAL SELF           Confirmed By: Blaine Krishna                                  Imaging Results    None          Medications   ketorolac injection 9.999 mg (has no administration in time range)   prochlorperazine injection Soln 10 mg (has no administration in time range)   lactated ringers bolus 500 mL (has no administration in time range)     Medical Decision Making  Emergent evaluation a 29-year-old female presenting today with headache and chest discomfort x1 week.  Overall well-appearing, no acute distress.  Vital signs are stable.    Differential includes but not limited to migraine, chondritis, musculoskeletal pain, anxiety, pericarditis, low suspicion for ACS as patient does not have risk factors.  EKG reassuring with no ischemic changes.  Also, low suspicion for PE, no evidence of hypoxia or tachycardia.  Neuro exam reassuring, low suspicion for acute intracranial process.    Plan for Toradol, Compazine, IV fluids, i-STAT, UPT        Amount and/or Complexity of Data Reviewed  Labs: ordered. Decision-making details documented in ED Course.  ECG/medicine tests: ordered and independent interpretation performed.    Risk  Prescription drug management.               ED Course as of 05/07/25 1446   Wed May 07, 2025   1413 Istat unremarkable   [GM]   1428 Patient reports improved sx.  Continue current meds.  Return to ED or follow up with PCP [GM]      ED Course User Index  [GM] Mariaelena Pineda MD                           Clinical Impression:  Final diagnoses:  [R07.9] Chest pain  [G43.109] Migraine with aura and without status migrainosus, not intractable (Primary)                   [1]   Social History  Tobacco Use    Smoking status: Never     Passive exposure: Never    Smokeless tobacco: Never   Substance Use Topics    Alcohol use: Not Currently    Drug use: Never        Mariaelena Pineda MD  05/07/25 1446

## 2025-05-30 ENCOUNTER — OFFICE VISIT (OUTPATIENT)
Dept: PRIMARY CARE CLINIC | Facility: CLINIC | Age: 30
End: 2025-05-30
Payer: COMMERCIAL

## 2025-05-30 VITALS
WEIGHT: 214.75 LBS | RESPIRATION RATE: 18 BRPM | OXYGEN SATURATION: 99 % | SYSTOLIC BLOOD PRESSURE: 110 MMHG | HEIGHT: 63 IN | HEART RATE: 66 BPM | BODY MASS INDEX: 38.05 KG/M2 | DIASTOLIC BLOOD PRESSURE: 80 MMHG

## 2025-05-30 DIAGNOSIS — K21.9 GASTROESOPHAGEAL REFLUX DISEASE, UNSPECIFIED WHETHER ESOPHAGITIS PRESENT: Primary | ICD-10-CM

## 2025-05-30 PROCEDURE — 99999 PR PBB SHADOW E&M-EST. PATIENT-LVL III: CPT | Mod: PBBFAC,,, | Performed by: INTERNAL MEDICINE

## 2025-05-30 RX ORDER — PANTOPRAZOLE SODIUM 40 MG/1
40 TABLET, DELAYED RELEASE ORAL DAILY
Qty: 30 TABLET | Refills: 1 | Status: SHIPPED | OUTPATIENT
Start: 2025-05-30 | End: 2026-05-30

## 2025-05-30 NOTE — PROGRESS NOTES
[facility] Note    Chief Complaint      Chief Complaint   Patient presents with    Hospital Follow Up       History of Present Illness      Moriah Lee is a 29 y.o. female who presents today for   Chief Complaint   Patient presents with    Hospital Follow Up   .  Patient comes to appointment here for er f/u for atyopical chest pain / migraine ha . I have reviewed er woerkup all negative . She describes the paihn as mid chest , sharp in nature , happens with or wihtoput exertions sometimes last for hours . .    Problem List Items Addressed This Visit       Gastroesophageal reflux disease - Primary    Overview   Pantopraziole 40 mg daily refill 1              Past Medical History:  Past Medical History:   Diagnosis Date    Allergy     Anxiety     Bipolar disorder     Depression     Facial trauma 08/12/2015    [place] Palsy    Food allergy 06/2010    Tomatoes    GERD (gastroesophageal reflux disease) 06/2010    Headache 10/2023    Seasonal allergies 05/2008    Ulcer 08/30/2016    First ulcer was in [employer] with H.pylori       Past Surgical History:  History reviewed. No pertinent surgical history.    Family History:  family history includes ADD / ADHD in her brother; Alcohol abuse in her maternal aunt, maternal uncle, paternal aunt, and paternal uncle; Aneurysm in her maternal grandmother; Arthritis in her mother; Asthma in her brother; Breast cancer in her mother; Cancer in her paternal grandmother; Diabetes in her maternal grandmother; Heart disease in her paternal grandmother; No Known Problems in her father, maternal grandfather, and paternal grandfather.    Social History:  Social History[1]    Review of Systems:   Review of Systems   Constitutional:  Negative for fever and weight loss.   HENT:  Negative for congestion, hearing loss and sore throat.    Eyes:  Negative for blurred vision.   Respiratory:  Negative for cough and shortness of breath.    Cardiovascular:  Negative for  "chest pain, palpitations, claudication and leg swelling.   Gastrointestinal:  Positive for heartburn. Negative for abdominal pain, constipation and diarrhea.   Genitourinary:  Negative for dysuria.   Musculoskeletal:  Negative for back pain and myalgias.   Skin:  Negative for rash.   Neurological:  Negative for focal weakness and headaches.   Psychiatric/Behavioral:  Negative for depression, memory loss and suicidal ideas. The patient is not nervous/anxious.          Medications:  Encounter Medications[2]     Allergies:  Review of patient's allergies indicates:   Allergen Reactions    Compazine [prochlorperazine] Swelling    Venom-honey bee Other (See Comments)     hives         Physical Exam         Vitals:    05/30/25 0946   BP: 110/80   Pulse: 66   Resp: 18         Physical Exam  Constitutional:       Appearance: She is well-developed.   Eyes:      Pupils: Pupils are equal, round, and reactive to light.   Neck:      Thyroid: No thyromegaly.   Cardiovascular:      Rate and Rhythm: Normal rate.      Heart sounds: Normal heart sounds. No murmur heard.     No friction rub. No gallop.   Pulmonary:      Breath sounds: Normal breath sounds.   Abdominal:      General: Bowel sounds are normal.      Palpations: Abdomen is soft.   Musculoskeletal:         General: Normal range of motion.      Cervical back: Normal range of motion.   Lymphadenopathy:      Cervical: No cervical adenopathy.   Skin:     General: Skin is warm.      Findings: No rash.   Neurological:      Mental Status: She is alert and oriented to person, place, and time.      Cranial Nerves: No cranial nerve deficit.   Psychiatric:         Behavior: Behavior normal.          Laboratory:  CBC:  Recent Labs   Lab Result Units 05/07/25  1304   POC Hematocrit %PCV 43     CMP:  No results for input(s): "GLU", "CALCIUM", "ALBUMIN", "PROT", "NA", "K", "CO2", "CL", "BUN", "ALKPHOS", "ALT", "AST", "BILITOT" in the last 2160 hours.    Invalid input(s): " ""CREATININ"  URINALYSIS:  No results for input(s): "COLORU", "CLARITYU", "SPECGRAV", "PHUR", "PROTEINUA", "GLUCOSEU", "BILIRUBINCON", "BLOODU", "WBCU", "RBCU", "BACTERIA", "MUCUS", "NITRITE", "LEUKOCYTESUR", "UROBILINOGEN", "HYALINECASTS" in the last 2160 hours.   LIPIDS:  No results for input(s): "TSH", "HDL", "CHOL", "TRIG", "LDLCALC", "CHOLHDL", "NONHDLCHOL", "TOTALCHOLEST" in the last 2160 hours.  TSH:  No results for input(s): "TSH" in the last 2160 hours.  A1C:  No results for input(s): "HGBA1C" in the last 2160 hours.    Radiology:        Assessment:     Moriah Lee is a 29 y.o.female with:    Gastroesophageal reflux disease, unspecified whether esophagitis present  -     pantoprazole (PROTONIX) 40 MG tablet; Take 1 tablet (40 mg total) by mouth once daily.  Dispense: 30 tablet; Refill: 1          Plan:     Problem List Items Addressed This Visit       Gastroesophageal reflux disease - Primary    Overview   Pantopraziole 40 mg daily refill 1            As above, continue current medications and maintain follow up with specialists.  Return to clinic as scheduled       [facility]antagn[facility]                       [1]   Social History  Socioeconomic History    Marital status: Single   Tobacco Use    Smoking status: Never     Passive exposure: Never    Smokeless tobacco: Never   Substance and Sexual Activity    Alcohol use: Not Currently    Drug use: Never    Sexual activity: Yes     Partners: Male     Birth control/protection: Coitus interruptus, Inserts     Social Drivers of Health     Financial Resource Strain: Low Risk  (3/13/2024)    Overall Financial Resource Strain (CARDIA)     Difficulty of Paying Living Expenses: Not very hard   Food Insecurity: No Food Insecurity (3/13/2024)    Hunger Vital Sign     Worried About Running Out of Food in the Last Year: Never true     Ran Out of Food in the Last Year: Never true   Transportation Needs: No " Transportation Needs (3/13/2024)    PRAPARE - Transportation     Lack of Transportation (Medical): No     Lack of Transportation (Non-Medical): No   Physical Activity: Insufficiently Active (3/13/2024)    Exercise Vital Sign     Days of Exercise per Week: 3 days     Minutes of Exercise per Session: 30 min   Stress: No Stress Concern Present (3/13/2024)    [employer] - Occupational Stress Questionnaire     Feeling of Stress : Only a little   Housing Stability: High Risk (3/13/2024)    Housing Stability Vital Sign     Unable to Pay for Housing in the Last Year: Yes     Number of Places Lived in the Last Year: 2     Unstable Housing in the Last Year: No   [2]   Outpatient Encounter Medications as of 5/30/2025   Medication Sig Dispense Refill    etonogestreL-ethinyl estradioL (NUVARING) 0.12-0.015 mg/24 hr vaginal ring Place 1 each vaginally every 21 days. Insert one (1) ring vaginally and leave in place for three (3) weeks, then remove for one (1) week. 4 each 3    pantoprazole (PROTONIX) 40 MG tablet Take 1 tablet (40 mg total) by mouth once daily. 30 tablet 1    [DISCONTINUED] ergocalciferol (ERGOCALCIFEROL) 50,000 unit Cap Take 1 capsule (50,000 Units total) by mouth every 7 days. 24 capsule 0    [DISCONTINUED] ferrous sulfate (FEOSOL) 325 mg (65 mg iron) Tab tablet TAKE 1 TABLET BY MOUTH ONCE DAILY. 90 tablet 1    [DISCONTINUED] metroNIDAZOLE (FLAGYL) 500 MG tablet Take 1 tablet (500 mg total) by mouth every 12 (twelve) hours. 14 tablet 0     No facility-administered encounter medications on file as of 5/30/2025.

## 2025-06-22 DIAGNOSIS — K21.9 GASTROESOPHAGEAL REFLUX DISEASE, UNSPECIFIED WHETHER ESOPHAGITIS PRESENT: ICD-10-CM

## 2025-06-22 NOTE — TELEPHONE ENCOUNTER
No care due was identified.  [facility] Embedded Care Due Messages. Reference number: [id number].   6/22/2025 6:48:26 AM CDT

## 2025-06-22 NOTE — TELEPHONE ENCOUNTER
Refill Routing Note   Medication(s) are not appropriate for processing by [facility] for the following reason(s):        New or recently adjusted medication    [facility] action(s):  Defer             Appointments  past 12m or future 3m with PCP    Date Provider   Last Visit   5/30/2025 Jamaal Leyva MD   Next Visit   Visit date not found Jamaal Leyva MD   ED visits in past 90 days: 1        Note composed:9:56 AM 06/22/2025

## 2025-06-23 RX ORDER — PANTOPRAZOLE SODIUM 40 MG/1
40 TABLET, DELAYED RELEASE ORAL
Qty: 90 TABLET | Refills: 3 | Status: SHIPPED | OUTPATIENT
Start: 2025-06-23

## 2025-08-25 ENCOUNTER — TELEPHONE (OUTPATIENT)
Dept: OBSTETRICS AND GYNECOLOGY | Facility: CLINIC | Age: 30
End: 2025-08-25
Payer: COMMERCIAL